# Patient Record
Sex: MALE | Race: WHITE | Employment: FULL TIME | URBAN - METROPOLITAN AREA
[De-identification: names, ages, dates, MRNs, and addresses within clinical notes are randomized per-mention and may not be internally consistent; named-entity substitution may affect disease eponyms.]

---

## 2017-04-21 ENCOUNTER — OFFICE VISIT (OUTPATIENT)
Dept: ORTHOPEDIC SURGERY | Age: 32
End: 2017-04-21

## 2017-04-21 VITALS
HEART RATE: 65 BPM | WEIGHT: 188 LBS | DIASTOLIC BLOOD PRESSURE: 80 MMHG | SYSTOLIC BLOOD PRESSURE: 128 MMHG | BODY MASS INDEX: 27.85 KG/M2 | HEIGHT: 69 IN

## 2017-04-21 DIAGNOSIS — S43.102A AC SEPARATION, TYPE 2, LEFT, INITIAL ENCOUNTER: Primary | ICD-10-CM

## 2017-04-21 PROBLEM — S43.109A AC SEPARATION, TYPE 2: Status: ACTIVE | Noted: 2017-04-21

## 2017-04-21 PROCEDURE — 99243 OFF/OP CNSLTJ NEW/EST LOW 30: CPT | Performed by: ORTHOPAEDIC SURGERY

## 2017-05-19 ENCOUNTER — OFFICE VISIT (OUTPATIENT)
Dept: ORTHOPEDIC SURGERY | Age: 32
End: 2017-05-19

## 2017-05-19 VITALS
HEIGHT: 69 IN | SYSTOLIC BLOOD PRESSURE: 109 MMHG | BODY MASS INDEX: 27.85 KG/M2 | HEART RATE: 79 BPM | DIASTOLIC BLOOD PRESSURE: 70 MMHG | WEIGHT: 188 LBS

## 2017-05-19 DIAGNOSIS — M89.8X1 PAIN OF LEFT CLAVICLE: ICD-10-CM

## 2017-05-19 DIAGNOSIS — S43.102D AC SEPARATION, TYPE 2, LEFT, SUBSEQUENT ENCOUNTER: Primary | ICD-10-CM

## 2017-05-19 PROCEDURE — 99213 OFFICE O/P EST LOW 20 MIN: CPT | Performed by: ORTHOPAEDIC SURGERY

## 2022-08-22 ENCOUNTER — OFFICE VISIT (OUTPATIENT)
Dept: PRIMARY CARE CLINIC | Age: 37
End: 2022-08-22
Payer: COMMERCIAL

## 2022-08-22 VITALS
WEIGHT: 178 LBS | OXYGEN SATURATION: 98 % | SYSTOLIC BLOOD PRESSURE: 110 MMHG | DIASTOLIC BLOOD PRESSURE: 76 MMHG | HEART RATE: 64 BPM | TEMPERATURE: 97.9 F | HEIGHT: 68 IN | BODY MASS INDEX: 26.98 KG/M2

## 2022-08-22 DIAGNOSIS — S43.102S AC SEPARATION, LEFT, SEQUELA: ICD-10-CM

## 2022-08-22 DIAGNOSIS — M77.11 LATERAL EPICONDYLITIS OF BOTH ELBOWS: ICD-10-CM

## 2022-08-22 DIAGNOSIS — I67.1 BRAIN ANEURYSM: Primary | ICD-10-CM

## 2022-08-22 DIAGNOSIS — Z13.220 ENCOUNTER FOR SCREENING FOR LIPOID DISORDERS: ICD-10-CM

## 2022-08-22 DIAGNOSIS — M77.12 LATERAL EPICONDYLITIS OF BOTH ELBOWS: ICD-10-CM

## 2022-08-22 DIAGNOSIS — M25.561 CHRONIC PAIN OF RIGHT KNEE: ICD-10-CM

## 2022-08-22 DIAGNOSIS — G89.29 CHRONIC PAIN OF RIGHT KNEE: ICD-10-CM

## 2022-08-22 DIAGNOSIS — Q27.30 AVM (ARTERIOVENOUS MALFORMATION): ICD-10-CM

## 2022-08-22 DIAGNOSIS — Z13.1 SCREENING FOR DIABETES MELLITUS: ICD-10-CM

## 2022-08-22 DIAGNOSIS — L29.9 PRURITUS: ICD-10-CM

## 2022-08-22 PROCEDURE — 99204 OFFICE O/P NEW MOD 45 MIN: CPT | Performed by: STUDENT IN AN ORGANIZED HEALTH CARE EDUCATION/TRAINING PROGRAM

## 2022-08-22 SDOH — ECONOMIC STABILITY: FOOD INSECURITY: WITHIN THE PAST 12 MONTHS, THE FOOD YOU BOUGHT JUST DIDN'T LAST AND YOU DIDN'T HAVE MONEY TO GET MORE.: NEVER TRUE

## 2022-08-22 SDOH — ECONOMIC STABILITY: FOOD INSECURITY: WITHIN THE PAST 12 MONTHS, YOU WORRIED THAT YOUR FOOD WOULD RUN OUT BEFORE YOU GOT MONEY TO BUY MORE.: NEVER TRUE

## 2022-08-22 ASSESSMENT — PATIENT HEALTH QUESTIONNAIRE - PHQ9
SUM OF ALL RESPONSES TO PHQ QUESTIONS 1-9: 0
1. LITTLE INTEREST OR PLEASURE IN DOING THINGS: 0
SUM OF ALL RESPONSES TO PHQ9 QUESTIONS 1 & 2: 0
SUM OF ALL RESPONSES TO PHQ QUESTIONS 1-9: 0
2. FEELING DOWN, DEPRESSED OR HOPELESS: 0

## 2022-08-22 ASSESSMENT — SOCIAL DETERMINANTS OF HEALTH (SDOH): HOW HARD IS IT FOR YOU TO PAY FOR THE VERY BASICS LIKE FOOD, HOUSING, MEDICAL CARE, AND HEATING?: NOT HARD AT ALL

## 2022-08-22 NOTE — PROGRESS NOTES
[address] [facility] PRIMARY CARE  [place] [address] [address] [facility] [address]  Dept: [phone]  Dept Fax: [phone]  Loc: [phone]      Larry Leon is a 44 y.o. male who presents today for:  Chief Complaint   Patient presents with    Knee Pain     Right        Shoulder Injury     Left         Tendonitis     Bilateral arms      Other     AVM      Skin Problem     Right forearm bumpy      Establish Care         HPI:   Larry Leon is 44 y.o. who presents today for establishing care. Right knee pain - medial, since April 2022. Active. Using ice, stretches, exercise. Feels weak/unstable. No discrete injury. Has popping occasionally. No swelling. Left shoulder -- injury while playing softball. Feeling weak. Unable to do pushup w/o pain. Does not feel like the shoulder dislocated. [facility]). Huyen Mckinney MD     Tendonitis - biceps distally and lateral elbow. Limited strength bilaterally. AVM - in brain, found 8 years ago. Intranidal aneurysm. Transferred to  but he declined treatment at that time. Currently asymptomatic. No family history of aneurysms. FINDINGS-     In the left parietal occipital lobe there is a tangle of abnormal   vessels consistent with an arteriovenous malformation. A round   hyperenhancing focus which corresponds to the hyperdense area on   the CT is 12 x 9 mm and is consistent with an intranidal aneurysm. Corresponding flow voids are present on the T2 sequence. There is   no evidence of surrounding hemorrhage. No abnormal signal on the   gradient echo sequence is present to suggest previous hemorrhage. There is no restricted diffusion or acute infarction. There is no   other mass, mass effect, or midline shift. There is no evidence of   acute intracranial hemorrhage. The ventricles are nondilated. Flow   related signal in the major intracranial vessels is preserved.    Post contrast images show no other pathologic enhancement. left parieto-occipital arteriovenous   malformation and a 12 mm intranidal aneurysm. History of HLD on previous labs on [facility]     Objective:     Vitals:    08/22/22 1613   BP: 110/76   Pulse: 64   Temp: 97.9 °F (36.6 °C)   SpO2: 98%         Wt Readings from Last 3 Encounters:   08/22/22 178 lb (80.7 kg)   05/19/17 188 lb (85.3 kg)   04/21/17 188 lb (85.3 kg)       BP Readings from Last 3 Encounters:   08/22/22 110/76   05/19/17 109/70   04/21/17 128/80     Physical Exam  Vitals and nursing note reviewed. Constitutional:       General: He is not in acute distress. Appearance: He is well-developed. He is not diaphoretic. HENT:      Head: Normocephalic and atraumatic. Right Ear: External ear normal.      Left Ear: External ear normal.      Nose: Nose normal.   Eyes:      General: No scleral icterus. Right eye: No discharge. Left eye: No discharge. Conjunctiva/sclera: Conjunctivae normal.   Cardiovascular:      Rate and Rhythm: Normal rate and regular rhythm. Heart sounds: Normal heart sounds. No murmur heard. Pulmonary:      Effort: Pulmonary effort is normal.      Breath sounds: Normal breath sounds. No wheezing or rales. Musculoskeletal:      Cervical back: Normal range of motion. Comments: UE strength 5/5 in all directions  ROM wnl   Clicking w/ rotation of left shoulder  Obvious separation of left AC joint   Rotator cuff intact, no deficits    Tenderness bilateral lateral epicondyles     Right knee:  Tender to palpation of medial joint line   +Richardson   Pain w/ valgus of MCL, no obvious laxity  No effusion   Skin:     General: Skin is warm and dry. Findings: No erythema or rash. Neurological:      Mental Status: He is alert and oriented to person, place, and time. Psychiatric:         Behavior: Behavior normal.         Thought Content:  Thought content normal.         Judgment: Judgment normal.       Immunization History Administered Date(s) Administered    COVID-19, PFIZER PURPLE top, DILUTE for use, (age 15 y+), 30mcg/0.3mL 04/01/2021, 04/22/2021, 11/30/2021       Health Maintenance Due   Topic Date Due    Depression Screen  Never done    HIV screen  Never done    Hepatitis C screen  Never done    Varicella vaccine (1 of 2 - 2-dose childhood series) Never done    Diabetes screen  Never done       Food Insecurity: No Food Insecurity    Worried About Running Out of Food in the Last Year: Never true    Ran Out of Food in the Last Year: Never true       Assessment / Plan:   1. Brain aneurysm  Chronic, asymptomatic. Referral to Neurosurgery  - KEVIN - Kajal Ferrara MD, Neurosurgery, [facility]    2. AVM (arteriovenous malformation)  Chronic, asymptomatic. Referral to Neurosurgery  - KEVIN Ferrara MD, Neurosurgery, [facility]    3. AC separation, left, sequela  Referral to Orthopedics for second opinion  - Kareem Coyle MD, Orthopedic Surgery, [facility]    4. Lateral epicondylitis of both elbows  Conservative management w/ braces, stretches  Refer to ortho    5. Chronic pain of right knee  Suspect meniscus vs. MCL tendinitis   Check XR knee  Refer to ortho   - XR KNEE RIGHT (3 VIEWS); Future  - Kareem Coyle MD, Orthopedic Surgery, [facility]    6. Pruritus  Check CBC  ? allergic response  Keep diary of symptoms and any exacerbating things   - CBC with Auto Differential; Future    7. Screening for diabetes mellitus  - Comprehensive Metabolic Panel, Fasting; Future    8. Encounter for screening for lipoid disorders  - Lipid, Fasting; Future      Preventative Medicine   UTD         Return in about 1 year (around 8/22/2023) for Physical .      No future appointments. Patient given educational materials - see patient instructions. Discussed use, benefit, and sideeffects of prescribed medications. All patient questions answered. Pt voiced understanding. Reviewed health maintenance. Instructed to continue current medications, diet and exercise. Patient agreed with treatment plan. Follow up as directed.      Electronically signed by Farrah Hickey DO on 8/22/2022 at 5:02 PM

## 2022-08-24 ENCOUNTER — HOSPITAL ENCOUNTER (OUTPATIENT)
Age: 37
Discharge: HOME | End: 2022-08-24
Payer: COMMERCIAL

## 2022-08-24 ENCOUNTER — HOSPITAL ENCOUNTER (OUTPATIENT)
Dept: GENERAL RADIOLOGY | Age: 37
Discharge: HOME | End: 2022-08-24
Payer: COMMERCIAL

## 2022-08-24 DIAGNOSIS — G89.29 CHRONIC PAIN OF RIGHT KNEE: ICD-10-CM

## 2022-08-24 DIAGNOSIS — M25.561 CHRONIC PAIN OF RIGHT KNEE: ICD-10-CM

## 2022-08-24 DIAGNOSIS — Z13.220 ENCOUNTER FOR SCREENING FOR LIPOID DISORDERS: ICD-10-CM

## 2022-08-24 DIAGNOSIS — L29.9 PRURITUS: ICD-10-CM

## 2022-08-24 DIAGNOSIS — Z13.1 SCREENING FOR DIABETES MELLITUS: ICD-10-CM

## 2022-08-24 LAB
A/G RATIO: 2.1 (ref 1.1–2.2)
ALBUMIN SERPL-MCNC: 4.9 G/DL (ref 3.4–5)
ALP BLD-CCNC: 77 U/L (ref 40–129)
ALT SERPL-CCNC: 13 U/L (ref 10–40)
ANION GAP SERPL CALCULATED.3IONS-SCNC: 8 MMOL/L (ref 3–16)
AST SERPL-CCNC: 18 U/L (ref 15–37)
BASOPHILS ABSOLUTE: 0 K/UL (ref 0–0.2)
BASOPHILS RELATIVE PERCENT: 0.3 %
BILIRUB SERPL-MCNC: 0.7 MG/DL (ref 0–1)
BUN BLDV-MCNC: 12 MG/DL (ref 7–20)
CALCIUM SERPL-MCNC: 10 MG/DL (ref 8.3–10.6)
CHLORIDE BLD-SCNC: 102 MMOL/L (ref 99–110)
CHOLESTEROL, FASTING: 249 MG/DL (ref 0–199)
CO2: 30 MMOL/L (ref 21–32)
CREAT SERPL-MCNC: 0.7 MG/DL (ref 0.9–1.3)
EOSINOPHILS ABSOLUTE: 0.1 K/UL (ref 0–0.6)
EOSINOPHILS RELATIVE PERCENT: 2 %
GFR AFRICAN AMERICAN: >60
GFR NON-AFRICAN AMERICAN: >60
GLUCOSE FASTING: 90 MG/DL (ref 70–99)
HCT VFR BLD CALC: 44.3 % (ref 40.5–52.5)
HDLC SERPL-MCNC: 50 MG/DL (ref 40–60)
HEMOGLOBIN: 15.1 G/DL (ref 13.5–17.5)
LDL CHOLESTEROL CALCULATED: 179 MG/DL
LYMPHOCYTES ABSOLUTE: 2.1 K/UL (ref 1–5.1)
LYMPHOCYTES RELATIVE PERCENT: 34.8 %
MCH RBC QN AUTO: 30.4 PG (ref 26–34)
MCHC RBC AUTO-ENTMCNC: 34.2 G/DL (ref 31–36)
MCV RBC AUTO: 89.1 FL (ref 80–100)
MONOCYTES ABSOLUTE: 0.4 K/UL (ref 0–1.3)
MONOCYTES RELATIVE PERCENT: 7.6 %
NEUTROPHILS ABSOLUTE: 3.3 K/UL (ref 1.7–7.7)
NEUTROPHILS RELATIVE PERCENT: 55.3 %
PDW BLD-RTO: 13.3 % (ref 12.4–15.4)
PLATELET # BLD: 231 K/UL (ref 135–450)
PMV BLD AUTO: 8.1 FL (ref 5–10.5)
POTASSIUM SERPL-SCNC: 4.1 MMOL/L (ref 3.5–5.1)
RBC # BLD: 4.97 M/UL (ref 4.2–5.9)
SODIUM BLD-SCNC: 140 MMOL/L (ref 136–145)
TOTAL PROTEIN: 7.2 G/DL (ref 6.4–8.2)
TRIGLYCERIDE, FASTING: 98 MG/DL (ref 0–150)
VLDLC SERPL CALC-MCNC: 20 MG/DL
WBC # BLD: 5.9 K/UL (ref 4–11)

## 2022-08-24 PROCEDURE — 73560 X-RAY EXAM OF KNEE 1 OR 2: CPT

## 2022-08-25 ENCOUNTER — OFFICE VISIT (OUTPATIENT)
Dept: ORTHOPEDIC SURGERY | Age: 37
End: 2022-08-25
Payer: COMMERCIAL

## 2022-08-25 VITALS — HEIGHT: 69 IN | WEIGHT: 178 LBS | BODY MASS INDEX: 26.36 KG/M2

## 2022-08-25 DIAGNOSIS — S83.241A TRAUMATIC TEAR OF MEDIAL MENISCUS OF RIGHT KNEE, INITIAL ENCOUNTER: Primary | ICD-10-CM

## 2022-08-25 DIAGNOSIS — M25.561 ACUTE PAIN OF RIGHT KNEE: ICD-10-CM

## 2022-08-25 PROCEDURE — 99203 OFFICE O/P NEW LOW 30 MIN: CPT | Performed by: ORTHOPAEDIC SURGERY

## 2022-08-25 PROCEDURE — G8427 DOCREV CUR MEDS BY ELIG CLIN: HCPCS | Performed by: ORTHOPAEDIC SURGERY

## 2022-08-25 PROCEDURE — G8419 CALC BMI OUT NRM PARAM NOF/U: HCPCS | Performed by: ORTHOPAEDIC SURGERY

## 2022-08-25 PROCEDURE — 1036F TOBACCO NON-USER: CPT | Performed by: ORTHOPAEDIC SURGERY

## 2022-08-25 NOTE — PROGRESS NOTES
Fly Denisse in today for new injury involving his right knee. In April he was building a shed and began having significant medial knee pain associated with kneeling squatting and twisting. His pain has persisted despite ice, stretching, exercises, and ibuprofen. He has significant pain when he shifts laterally and he will feel a pop in the medial knee. Pain at best is 3 out of 10 but can be made worse. He works as a . He is otherwise healthy. History: Patient's relevant past family, medical, and social history are reviewed as part of today's visit. ROS of pertinent positives and negatives as above; otherwise negative. General Exam:    Vitals: Height 5' 9\" (1.753 m), weight 178 lb (80.7 kg). Constitutional: Patient is adequately groomed with no evidence of malnutrition  Mental Status: The patient is oriented to time, place and person. The patient's mood and affect are appropriate. Gait:  Patient walks with normal gait and station. Lymphatic: The lymphatic examination bilaterally reveals all areas to be without enlargement or induration. Vascular: Examination reveals no swelling or calf tenderness. Peripheral pulses are palpable and 2+. Neurological: The patient has good coordination. There is no weakness or sensory deficit. Skin:    Head/Neck: inspection reveals no rashes, ulcerations or lesions. Trunk:  inspection reveals no rashes, ulcerations or lesions. Right Lower Extremity: inspection reveals no rashes, ulcerations or lesions. Left Lower Extremity: inspection reveals no rashes, ulcerations or lesions. Examination of the bilateral hips reveals normal flexion and extension. There is no restriction in rotation. There is no tenderness to palpation anteriorly posteriorly or laterally. Left knee examination demonstrates no effusion. There is no tenderness to palpation over the medial or lateral joint line. There is no discomfort over the patellar tendon.   There is no palpable popliteal cyst.  Sensation is intact. Range of motion is normal.  There is no patellofemoral crepitation. There is no instability to varus or valgus stress applied at 0, 30, 60, or 90Â° of flexion. There is no anterior or posterior drawer. Lachman examination is normal.    Right knee has mild crepitation. He has significant pain over the medial joint line. He has pain with Richardson's maneuver. He is ligamentously stable. He has no large effusion. There is a palpable click medially. Right knee x-rays from the hospital 2 views are reviewed and show no acute bony abnormalities. Assessment: Traumatic medial meniscus tear right knee. Plan: MRI right knee.   Follow-up with me after the scan

## 2022-09-01 ENCOUNTER — OFFICE VISIT (OUTPATIENT)
Dept: ORTHOPEDIC SURGERY | Age: 37
End: 2022-09-01
Payer: COMMERCIAL

## 2022-09-01 VITALS — HEIGHT: 69 IN | BODY MASS INDEX: 26.36 KG/M2 | WEIGHT: 178 LBS

## 2022-09-01 DIAGNOSIS — M25.561 ACUTE PAIN OF RIGHT KNEE: ICD-10-CM

## 2022-09-01 DIAGNOSIS — S83.241A TRAUMATIC TEAR OF MEDIAL MENISCUS OF RIGHT KNEE, INITIAL ENCOUNTER: Primary | ICD-10-CM

## 2022-09-01 PROCEDURE — G8427 DOCREV CUR MEDS BY ELIG CLIN: HCPCS | Performed by: ORTHOPAEDIC SURGERY

## 2022-09-01 PROCEDURE — 1036F TOBACCO NON-USER: CPT | Performed by: ORTHOPAEDIC SURGERY

## 2022-09-01 PROCEDURE — 99214 OFFICE O/P EST MOD 30 MIN: CPT | Performed by: ORTHOPAEDIC SURGERY

## 2022-09-01 PROCEDURE — G8419 CALC BMI OUT NRM PARAM NOF/U: HCPCS | Performed by: ORTHOPAEDIC SURGERY

## 2022-09-01 NOTE — PROGRESS NOTES
Natty Son returns today to follow-up his right knee MRI. At rest pain is only 2 out of 10. He has not been very active. General Exam:    Vitals: Height 5' 9\" (1.753 m), weight 178 lb (80.7 kg). Constitutional: Patient is adequately groomed with no evidence of malnutrition  Mental Status: The patient is oriented to time, place and person. The patient's mood and affect are appropriate. Right knee has mild crepitation. He has significant pain over the medial joint line. He has pain with Richardson's maneuver. He is ligamentously stable. He has no large effusion. There is a palpable click medially      MRI right knee is reviewed and demonstrates:  Exam Date: 08/26/2022   Exam Description: MR Right Knee w/o Contrast            HISTORY:  Meniscal tear. Knee pain. Tenderness and popping. TECHNICAL FACTORS:  Long- and short-axis fat- and water-weighted images were performed. COMPARISON:  None. FINDINGS:  Swelling in the knee notch. Intact ACL and PCL without tear. Intact thickened MCL without tear, medial joint line swelling. Intact ITB without tear. Normal FCL. Flap tear of the medial meniscus midbody into posterior horn 3 cm. No lateral meniscus tear. Mild medial femorotibial cartilage thinning. Mild lateral femorotibial chondromalacia. Intact quadriceps and patellar tendons. Lateral patellar subluxation and tilt. Mild insertional quadriceps enthesopathy. No Anna or    [place]. Patellofemoral chondromalacia. No plical thickening. Effusion. No loose body. Slightly leaking Baker's cyst.       No stress fracture or macro fracture. Normal tibiofibular joint. CONCLUSION:   1. Flap tear of the medial meniscus midbody into posterior horn 3 cm. 2. Mild tricompartment chondromalacia. 3. Effusion and leaking Baker's cyst.       I reviewed these findings on the report and the images with the patient.     Assessment: Flap tear medial meniscus right knee. Plan: We reviewed the risks, benefits, and alternatives to surgery. The alternatives include conservative management including medications, injections, and physical therapy as well as observation. Risks of surgery include but are not limited to persistent pain, instability, and reinjury. Risks also include risk of infection which could result in the need for further surgery and long-term use of antibiotics. Risks also include deep venous thromboses and pulmonary emboli. Risks also include problems with anesthesia including but not limited to cardiovascular compromise , stroke,  and death. The patient understands that the goal of surgery is to improve pain and function but that can never be guaranteed. We discussed at length the option of right knee arthroscopy with partial medial meniscectomy. He would likely proceed with that the knee is calm and talk about things with his family and work. He will let us know how he would like to proceed. All questions have been answered at length.

## 2022-09-08 ENCOUNTER — TELEPHONE (OUTPATIENT)
Dept: ORTHOPEDIC SURGERY | Age: 37
End: 2022-09-08

## 2022-09-08 NOTE — TELEPHONE ENCOUNTER
Called and scheduled patient for surgery 10-12-22. Patient is currently recovering from Covid and was symptomatic. Patient will stop in office to sign the consent.

## 2022-09-08 NOTE — TELEPHONE ENCOUNTER
Surgery and/or Procedure Scheduling     Contact Name: Terry Johnson"  Surgical/Procedure Request: SX ON RT KNEE  Patient Contact Number: [phone]    Patient call and would like to get schedule for Sx. Please Advise.

## 2022-09-21 ENCOUNTER — TELEPHONE (OUTPATIENT)
Dept: ORTHOPEDIC SURGERY | Age: 37
End: 2022-09-21

## 2022-10-05 ENCOUNTER — TELEPHONE (OUTPATIENT)
Dept: ORTHOPEDIC SURGERY | Age: 37
End: 2022-10-05

## 2022-10-05 RX ORDER — MINOXIDIL 50 MG/G
AEROSOL, FOAM TOPICAL 2 TIMES DAILY
COMMUNITY

## 2022-10-05 RX ORDER — FINASTERIDE 1 MG/1
1 TABLET, FILM COATED ORAL NIGHTLY
COMMUNITY

## 2022-10-05 NOTE — PROGRESS NOTES
DOS 10/12/22   83    PATIENT SEEING MD COVERING FOR DR. BALES (WHO IS OUT ON LEAVE) ON 10/10/22- [facility] PRE-OP H&P [facility] PROVIDER-PLEASE MAKE SURE IN Epic      See 10/10/22 encounters office visit for H&P

## 2022-10-05 NOTE — TELEPHONE ENCOUNTER
CPT: 49672  BODY PART: right knee  STATUS: outpatient  LOCATION: [facility]  AUTHORIZATION: approved    Submitted online with [facility], [id number]    Surgery approved # [id number]  10/12/22-1/10/23

## 2022-10-05 NOTE — PROGRESS NOTES
[facility] [facility] time__0950__________        Surgery time__1150__________    Take the following medications with a sip of water: Follow your MD/Surgeons pre-procedure instructions regarding your medications     Do not eat or drink anything after 12:00 midnight prior to your surgery. This includes water chewing gum, mints and ice chips. You may brush your teeth and gargle the morning of your surgery, but do not swallow the water     Please see your family doctor/pediatrician for a history and physical and/or concerning medications. Bring any test results/reports from your physicians office. If you are under the care of a heart doctor or specialist doctor, please be aware that you may be asked to them for clearance    You may be asked to stop blood thinners such as Coumadin, Plavix, Fragmin, Lovenox, etc., or any anti-inflammatories such as:  Aspirin, Ibuprofen, Advil, Naproxen prior to your surgery. MAY TAKE TYLENOL   We also ask that you stop any OTC medications such as fish oil, vitamin E, glucosamine, garlic, Multivitamins, COQ 10, etc.    We ask that you do not smoke 24 hours prior to surgery  We ask that you do not  drink any alcoholic beverages 24 hours prior to surgery     You must make arrangements for a responsible adult to take you home after your surgery. For your safety you will not be allowed to leave alone or drive yourself home. Your surgery will be cancelled if you do not have a ride home. Also for your safety, it is strongly suggested that someone stay with you the first 24 hours after your surgery. A parent or legal guardian must accompany a child scheduled for surgery and plan to stay at the hospital until the child is discharged. Please do not bring other children with you. For your comfort, please wear simple loose fitting clothing to the hospital.  Please do not bring valuables.     Do not wear any make-up or nail polish on your fingers or toes      For your safety, please do not wear any jewelry or body piercing's on the day of surgery. All jewelry must be removed. If you have dentures, they will be removed before going to operating room. For your convenience, we will provide you with a container. If you wear contact lenses or glasses, they will be removed, please bring a case for them. If you have a living will and a durable power of  for healthcare, please bring in a copy. As part of our patient safety program to minimize surgical site infections, we ask you to do the following:    Please notify your surgeon if you develop any illness between         now and the  day of your surgery. This includes a cough, cold, fever, sore throat, nausea,         or vomiting, and diarrhea, etc.   Please notify your surgeon if you experience dizziness, shortness         of breath or blurred vision between now and the time of your surgery. Do not shave your operative site 96 hours prior to surgery. For face and neck surgery, men may use an electric razor 48 hours   prior to surgery. You may shower the night before surgery or the morning of   your surgery with an antibacterial soap. You will need to bring a photo ID and insurance card    [facility] has an onsite pharmacy, would you like to utilize our pharmacy     If you will be staying overnight and use a C-pap machine, please bring   your C-pap to hospital     Our goal is to provide you with excellent care, therefore, visitors will be limited to two(2) in the room at a time so that we may focus on providing this care for you. Please contact pre-admission testing if you have any further questions.                  [facility] phone number:  [phone] [facility] fax number:  [phone]  Please note these are generalized instructions for all surgical cases, you may be provided with more specific instructions according to your surgery. C-Difficile admission screening and protocol:       * Admitted with diarrhea? [] YES    [x]  NO     *Prior history of C-Diff. In last 3 months? [] YES    [x]  NO     *Antibiotic use in the past 6-8 weeks? [x]  NO    []  YES                 If yes, which ANTIBIOTIC AND REASON______     *Prior hospitalization or nursing home in the last month? []  YES    [x]  NO        SAFETY FIRST. .call before you fall

## 2022-10-10 ENCOUNTER — OFFICE VISIT (OUTPATIENT)
Dept: PRIMARY CARE CLINIC | Age: 37
End: 2022-10-10
Payer: COMMERCIAL

## 2022-10-10 VITALS
DIASTOLIC BLOOD PRESSURE: 72 MMHG | HEART RATE: 82 BPM | WEIGHT: 180.2 LBS | BODY MASS INDEX: 26.69 KG/M2 | TEMPERATURE: 97.3 F | OXYGEN SATURATION: 96 % | RESPIRATION RATE: 18 BRPM | SYSTOLIC BLOOD PRESSURE: 110 MMHG | HEIGHT: 69 IN

## 2022-10-10 DIAGNOSIS — G89.29 CHRONIC PAIN OF RIGHT KNEE: ICD-10-CM

## 2022-10-10 DIAGNOSIS — Z01.818 PREOP EXAMINATION: Primary | ICD-10-CM

## 2022-10-10 DIAGNOSIS — M25.561 CHRONIC PAIN OF RIGHT KNEE: ICD-10-CM

## 2022-10-10 PROCEDURE — G8419 CALC BMI OUT NRM PARAM NOF/U: HCPCS | Performed by: STUDENT IN AN ORGANIZED HEALTH CARE EDUCATION/TRAINING PROGRAM

## 2022-10-10 PROCEDURE — G8484 FLU IMMUNIZE NO ADMIN: HCPCS | Performed by: STUDENT IN AN ORGANIZED HEALTH CARE EDUCATION/TRAINING PROGRAM

## 2022-10-10 PROCEDURE — 1036F TOBACCO NON-USER: CPT | Performed by: STUDENT IN AN ORGANIZED HEALTH CARE EDUCATION/TRAINING PROGRAM

## 2022-10-10 PROCEDURE — 99213 OFFICE O/P EST LOW 20 MIN: CPT | Performed by: STUDENT IN AN ORGANIZED HEALTH CARE EDUCATION/TRAINING PROGRAM

## 2022-10-10 PROCEDURE — G8427 DOCREV CUR MEDS BY ELIG CLIN: HCPCS | Performed by: STUDENT IN AN ORGANIZED HEALTH CARE EDUCATION/TRAINING PROGRAM

## 2022-10-10 ASSESSMENT — PATIENT HEALTH QUESTIONNAIRE - PHQ9
SUM OF ALL RESPONSES TO PHQ QUESTIONS 1-9: 0
2. FEELING DOWN, DEPRESSED OR HOPELESS: 0
SUM OF ALL RESPONSES TO PHQ QUESTIONS 1-9: 0

## 2022-10-10 NOTE — PROGRESS NOTES
Preoperative Consultation      Tova Sanchez  YOB: 1985    Date of Service:  10/10/2022    Vitals:    10/10/22 0838   BP: 110/72   Site: Right Upper Arm   Position: Sitting   Cuff Size: Large Adult   Pulse: 82   Resp: 18   Temp: 97.3 °F (36.3 °C)   TempSrc: Infrared   SpO2: 96%   Weight: 180 lb 3.2 oz (81.7 kg)   Height: 5' 9\" (1.753 m)      Wt Readings from Last 2 Encounters:   10/13/22 175 lb (79.4 kg)   10/12/22 175 lb 4.3 oz (79.5 kg)     BP Readings from Last 3 Encounters:   10/12/22 110/65   10/10/22 110/72   08/22/22 110/76        Chief Complaint   Patient presents with    Pre-op Exam     Randi Garza On RT Knee   Dr. Nils BOBO 10/12/22     No Known Allergies  Outpatient Medications Marked as Taking for the 10/10/22 encounter (Office Visit) with Jasvir Mendez MD   Medication Sig Dispense Refill    finasteride (PROPECIA) 1 MG tablet Take 1 mg by mouth at bedtime      Minoxidil (MINOXIDIL FOR MEN) 5 % FOAM Apply topically 2 times daily APPLY TO SCALP         This patient presents to the office today for a preoperative consultation at the request of surgeon, See above. History of AVM, 8 yrs ago found it incidentally doesn't cause him any issues. Right knee pain since March this year progressively getting worse, feels unstable, was doing stretches and exercise at home but no physical therapy. Had MRI which showed tear.       Planned anesthesia: General   Known anesthesia problems: None   Bleeding risk: No recent or remote history of abnormal bleeding  Personal or FH of DVT/PE: No    Patient objection to receiving blood products: No          Patient Active Problem List   Diagnosis    AC separation, left, sequela    Lateral epicondylitis of both elbows    Arteriovenous malformation    Brain aneurysm    Chronic pain of right knee    Acute medial meniscus tear of right knee       Past Medical History:   Diagnosis Date    AVM (arteriovenous malformation) brain Hyperlipidemia     NO MEDS    Wears contact lenses     Wears glasses      Past Surgical History:   Procedure Laterality Date    CYST REMOVAL Left 2003    left wrist    KNEE ARTHROSCOPY Right 10/12/2022    RIGHT KNEE ARTHROSCOPY MEDIAL MENISCECTOMY performed by Eric Mccray MD at [address] [place] [address]       Family History   Problem Relation Age of Onset    No Known Problems Mother     High Cholesterol Father     No Known Problems Sister      Social History     Socioeconomic History    Marital status: Single     Spouse name: Not on file    Number of children: Not on file    Years of education: Not on file    Highest education level: Not on file   Occupational History    Not on file   Tobacco Use    Smoking status: Never    Smokeless tobacco: Never   Vaping Use    Vaping Use: Never used   Substance and Sexual Activity    Alcohol use: Yes     Alcohol/week: 5.0 standard drinks     Types: 5 Cans of beer per week     Comment: SOCIALLY    Drug use: Never    Sexual activity: Not on file   Other Topics Concern    Not on file   Social History Narrative    Not on file     Social Determinants of Health     Financial Resource Strain: Low Risk     Difficulty of Paying Living Expenses: Not hard at all   Food Insecurity: No Food Insecurity    Worried About Running Out of Food in the Last Year: Never true    Ran Out of Food in the Last Year: Never true   Transportation Needs: Not on file   Physical Activity: Not on file   Stress: Not on file   Social Connections: Not on file   Intimate Partner Violence: Not on file   Housing Stability: Not on file       Review of Systems  A comprehensive review of systems was negative except for what was noted in the HPI. Physical Exam   Constitutional: He is oriented to person, place, and time. He appears well-developed and well-nourished. No distress. HENT:   Head: Normocephalic and atraumatic.    Mouth/Throat: Uvula is midline, oropharynx is clear and moist and mucous membranes are normal.   Eyes: Conjunctivae and EOM are normal. Pupils are equal, round, and reactive to light. Neck: Trachea normal and normal range of motion. Neck supple. No JVD present. Carotid bruit is not present. No mass and no thyromegaly present. Cardiovascular: Normal rate, regular rhythm, normal heart sounds and intact distal pulses. Exam reveals no gallop and no friction rub. No murmur heard. Pulmonary/Chest: Effort normal and breath sounds normal. No respiratory distress. He has no wheezes. He has no rales. Abdominal: Soft. Normal aorta and bowel sounds are normal. He exhibits no distension and no mass. There is no hepatosplenomegaly. No tenderness. Musculoskeletal: He exhibits no edema and no tenderness. Neurological: He is alert and oriented to person, place, and time. He has normal strength. No cranial nerve deficit or sensory deficit. Coordination and gait normal.   Skin: Skin is warm and dry. No rash noted. No erythema. Psychiatric: He has a normal mood and affect.  His behavior is normal.     EKG Interpretation:    N/A    Lab Review   Orders Only on 08/24/2022   Component Date Value    Cholesterol, Fasting 08/24/2022 249 (A)     Triglyceride, Fasting 08/24/2022 98     HDL 08/24/2022 50     LDL Calculated 08/24/2022 179 (A)     VLDL Cholesterol Calcula* 08/24/2022 20     WBC 08/24/2022 5.9     RBC 08/24/2022 4.97     Hemoglobin 08/24/2022 15.1     Hematocrit 08/24/2022 44.3     MCV 08/24/2022 89.1     MCH 08/24/2022 30.4     MCHC 08/24/2022 34.2     RDW 08/24/2022 13.3     Platelets 74/71/6544 231     MPV 08/24/2022 8.1     Neutrophils % 08/24/2022 55.3     Lymphocytes % 08/24/2022 34.8     Monocytes % 08/24/2022 7.6     Eosinophils % 08/24/2022 2.0     Basophils % 08/24/2022 0.3     Neutrophils Absolute 08/24/2022 3.3     Lymphocytes Absolute 08/24/2022 2.1     Monocytes Absolute 08/24/2022 0.4     Eosinophils Absolute 08/24/2022 0.1     Basophils Absolute 08/24/2022 0.0     Sodium 08/24/2022 140 Potassium 08/24/2022 4.1     Chloride 08/24/2022 102     CO2 08/24/2022 30     Anion Gap 08/24/2022 8     Glucose, Fasting 08/24/2022 90     BUN 08/24/2022 12     Creatinine 08/24/2022 0.7 (A)     GFR Non- 08/24/2022 >60     GFR  08/24/2022 >60     Calcium 08/24/2022 10.0     Total Protein 08/24/2022 7.2     Albumin 08/24/2022 4.9     Albumin/Globulin Ratio 08/24/2022 2.1     Total Bilirubin 08/24/2022 0.7     Alkaline Phosphatase 08/24/2022 77     ALT 08/24/2022 13     AST 08/24/2022 18            Assessment:       44 y.o. patient with planned surgery as above. Known risk factors for perioperative complications: None  Current medications which may produce withdrawal symptoms if withheld perioperatively: none      Plan:     1. Preoperative workup as follows: none  2. Change in medication regimen before surgery: Hold all medications on morning of surgery  3. Prophylaxis for cardiac events with perioperative beta-blockers: Not indicated  ACC/AHA indications for pre-operative beta-blocker use:    Vascular surgery with history of postitive stress test  Intermediate or high risk surgery with history of CAD   Intermediate or high risk surgery with multiple clinical predictors of CAD- 2 of the following: history of compensated or prior heart failure, history of cerebrovascular disease, DM, or renal insufficiency    Routine administration of higher-dose, long-acting metoprolol in beta-blocker-naïve patients on the day of surgery, and in the absence of dose titration is associated with an overall increase in mortality. Beta-blockers should be started days to weeks prior to surgery and titrated to pulse < 70.  4. Deep vein thrombosis prophylaxis: regimen to be chosen by surgical team  5.  No contraindications to planned surgery

## 2022-10-11 ENCOUNTER — ANESTHESIA EVENT (OUTPATIENT)
Dept: OPERATING ROOM | Age: 37
End: 2022-10-11
Payer: COMMERCIAL

## 2022-10-11 DIAGNOSIS — M25.561 ACUTE PAIN OF RIGHT KNEE: ICD-10-CM

## 2022-10-11 DIAGNOSIS — S83.241A TRAUMATIC TEAR OF MEDIAL MENISCUS OF RIGHT KNEE, INITIAL ENCOUNTER: Primary | ICD-10-CM

## 2022-10-11 RX ORDER — HYDROCODONE BITARTRATE AND ACETAMINOPHEN 5; 325 MG/1; MG/1
1 TABLET ORAL EVERY 4 HOURS PRN
Qty: 20 TABLET | Refills: 0 | Status: SHIPPED | OUTPATIENT
Start: 2022-10-12 | End: 2022-10-17

## 2022-10-11 RX ORDER — DOCUSATE SODIUM 100 MG/1
100 CAPSULE, LIQUID FILLED ORAL 2 TIMES DAILY
Qty: 60 CAPSULE | Refills: 0 | Status: SHIPPED | OUTPATIENT
Start: 2022-10-12

## 2022-10-11 RX ORDER — PROMETHAZINE HYDROCHLORIDE 25 MG/1
25 TABLET ORAL EVERY 6 HOURS PRN
Qty: 30 TABLET | Refills: 0 | Status: SHIPPED | OUTPATIENT
Start: 2022-10-12 | End: 2022-10-18

## 2022-10-12 ENCOUNTER — HOSPITAL ENCOUNTER (OUTPATIENT)
Age: 37
Setting detail: OUTPATIENT SURGERY
Discharge: HOME | End: 2022-10-12
Attending: ORTHOPAEDIC SURGERY | Admitting: ORTHOPAEDIC SURGERY
Payer: COMMERCIAL

## 2022-10-12 ENCOUNTER — ANESTHESIA (OUTPATIENT)
Dept: OPERATING ROOM | Age: 37
End: 2022-10-12
Payer: COMMERCIAL

## 2022-10-12 VITALS
DIASTOLIC BLOOD PRESSURE: 65 MMHG | HEIGHT: 69 IN | OXYGEN SATURATION: 98 % | RESPIRATION RATE: 16 BRPM | SYSTOLIC BLOOD PRESSURE: 110 MMHG | TEMPERATURE: 96.8 F | HEART RATE: 60 BPM | WEIGHT: 175.27 LBS | BODY MASS INDEX: 25.96 KG/M2

## 2022-10-12 PROBLEM — S83.241A ACUTE MEDIAL MENISCUS TEAR OF RIGHT KNEE: Status: ACTIVE | Noted: 2022-10-12

## 2022-10-12 PROCEDURE — 7100000010 HC PHASE II RECOVERY - FIRST 15 MIN: Performed by: ORTHOPAEDIC SURGERY

## 2022-10-12 PROCEDURE — 3600000004 HC SURGERY LEVEL 4 BASE: Performed by: ORTHOPAEDIC SURGERY

## 2022-10-12 PROCEDURE — 3700000001 HC ADD 15 MINUTES (ANESTHESIA): Performed by: ORTHOPAEDIC SURGERY

## 2022-10-12 PROCEDURE — 2580000003 HC RX 258: Performed by: ORTHOPAEDIC SURGERY

## 2022-10-12 PROCEDURE — 2500000003 HC RX 250 WO HCPCS

## 2022-10-12 PROCEDURE — 7100000001 HC PACU RECOVERY - ADDTL 15 MIN: Performed by: ORTHOPAEDIC SURGERY

## 2022-10-12 PROCEDURE — 3700000000 HC ANESTHESIA ATTENDED CARE: Performed by: ORTHOPAEDIC SURGERY

## 2022-10-12 PROCEDURE — 7100000000 HC PACU RECOVERY - FIRST 15 MIN: Performed by: ORTHOPAEDIC SURGERY

## 2022-10-12 PROCEDURE — 3600000014 HC SURGERY LEVEL 4 ADDTL 15MIN: Performed by: ORTHOPAEDIC SURGERY

## 2022-10-12 PROCEDURE — 6360000002 HC RX W HCPCS

## 2022-10-12 PROCEDURE — 2720000010 HC SURG SUPPLY STERILE: Performed by: ORTHOPAEDIC SURGERY

## 2022-10-12 PROCEDURE — 2500000003 HC RX 250 WO HCPCS: Performed by: ORTHOPAEDIC SURGERY

## 2022-10-12 PROCEDURE — 6360000002 HC RX W HCPCS: Performed by: ORTHOPAEDIC SURGERY

## 2022-10-12 PROCEDURE — 6360000002 HC RX W HCPCS: Performed by: ANESTHESIOLOGY

## 2022-10-12 PROCEDURE — 7100000011 HC PHASE II RECOVERY - ADDTL 15 MIN: Performed by: ORTHOPAEDIC SURGERY

## 2022-10-12 PROCEDURE — 2709999900 HC NON-CHARGEABLE SUPPLY: Performed by: ORTHOPAEDIC SURGERY

## 2022-10-12 RX ORDER — HYDROCODONE BITARTRATE AND ACETAMINOPHEN 5; 325 MG/1; MG/1
1 TABLET ORAL
Status: DISCONTINUED | OUTPATIENT
Start: 2022-10-12 | End: 2022-10-12 | Stop reason: HOSPADM

## 2022-10-12 RX ORDER — PROPOFOL 10 MG/ML
INJECTION, EMULSION INTRAVENOUS PRN
Status: DISCONTINUED | OUTPATIENT
Start: 2022-10-12 | End: 2022-10-12 | Stop reason: SDUPTHER

## 2022-10-12 RX ORDER — SODIUM CHLORIDE 9 MG/ML
INJECTION, SOLUTION INTRAVENOUS PRN
Status: DISCONTINUED | OUTPATIENT
Start: 2022-10-12 | End: 2022-10-12 | Stop reason: HOSPADM

## 2022-10-12 RX ORDER — MIDAZOLAM HYDROCHLORIDE 1 MG/ML
INJECTION INTRAMUSCULAR; INTRAVENOUS PRN
Status: DISCONTINUED | OUTPATIENT
Start: 2022-10-12 | End: 2022-10-12 | Stop reason: SDUPTHER

## 2022-10-12 RX ORDER — DEXAMETHASONE SODIUM PHOSPHATE 4 MG/ML
INJECTION, SOLUTION INTRA-ARTICULAR; INTRALESIONAL; INTRAMUSCULAR; INTRAVENOUS; SOFT TISSUE PRN
Status: DISCONTINUED | OUTPATIENT
Start: 2022-10-12 | End: 2022-10-12 | Stop reason: SDUPTHER

## 2022-10-12 RX ORDER — OXYCODONE HYDROCHLORIDE 5 MG/1
5 TABLET ORAL
Status: DISCONTINUED | OUTPATIENT
Start: 2022-10-12 | End: 2022-10-12 | Stop reason: HOSPADM

## 2022-10-12 RX ORDER — ONDANSETRON 2 MG/ML
INJECTION INTRAMUSCULAR; INTRAVENOUS PRN
Status: DISCONTINUED | OUTPATIENT
Start: 2022-10-12 | End: 2022-10-12 | Stop reason: SDUPTHER

## 2022-10-12 RX ORDER — FENTANYL CITRATE 50 UG/ML
INJECTION, SOLUTION INTRAMUSCULAR; INTRAVENOUS PRN
Status: DISCONTINUED | OUTPATIENT
Start: 2022-10-12 | End: 2022-10-12 | Stop reason: SDUPTHER

## 2022-10-12 RX ORDER — ONDANSETRON 2 MG/ML
4 INJECTION INTRAMUSCULAR; INTRAVENOUS
Status: DISCONTINUED | OUTPATIENT
Start: 2022-10-12 | End: 2022-10-12 | Stop reason: HOSPADM

## 2022-10-12 RX ORDER — SODIUM CHLORIDE 0.9 % (FLUSH) 0.9 %
5-40 SYRINGE (ML) INJECTION EVERY 12 HOURS SCHEDULED
Status: DISCONTINUED | OUTPATIENT
Start: 2022-10-12 | End: 2022-10-12 | Stop reason: HOSPADM

## 2022-10-12 RX ORDER — LIDOCAINE HYDROCHLORIDE 20 MG/ML
INJECTION, SOLUTION EPIDURAL; INFILTRATION; INTRACAUDAL; PERINEURAL PRN
Status: DISCONTINUED | OUTPATIENT
Start: 2022-10-12 | End: 2022-10-12 | Stop reason: SDUPTHER

## 2022-10-12 RX ORDER — SODIUM CHLORIDE, SODIUM LACTATE, POTASSIUM CHLORIDE, CALCIUM CHLORIDE 600; 310; 30; 20 MG/100ML; MG/100ML; MG/100ML; MG/100ML
INJECTION, SOLUTION INTRAVENOUS CONTINUOUS PRN
Status: COMPLETED | OUTPATIENT
Start: 2022-10-12 | End: 2022-10-12

## 2022-10-12 RX ORDER — BUPIVACAINE HYDROCHLORIDE AND EPINEPHRINE 5; 5 MG/ML; UG/ML
INJECTION, SOLUTION EPIDURAL; INTRACAUDAL; PERINEURAL
Status: COMPLETED | OUTPATIENT
Start: 2022-10-12 | End: 2022-10-12

## 2022-10-12 RX ORDER — SODIUM CHLORIDE 0.9 % (FLUSH) 0.9 %
5-40 SYRINGE (ML) INJECTION PRN
Status: DISCONTINUED | OUTPATIENT
Start: 2022-10-12 | End: 2022-10-12 | Stop reason: HOSPADM

## 2022-10-12 RX ORDER — FENTANYL CITRATE 50 UG/ML
25 INJECTION, SOLUTION INTRAMUSCULAR; INTRAVENOUS EVERY 5 MIN PRN
Status: DISCONTINUED | OUTPATIENT
Start: 2022-10-12 | End: 2022-10-12 | Stop reason: HOSPADM

## 2022-10-12 RX ADMIN — ONDANSETRON 4 MG: 2 INJECTION INTRAMUSCULAR; INTRAVENOUS at 12:35

## 2022-10-12 RX ADMIN — PROPOFOL 200 MG: 10 INJECTION, EMULSION INTRAVENOUS at 12:05

## 2022-10-12 RX ADMIN — HYDROMORPHONE HYDROCHLORIDE 0.5 MG: 1 INJECTION, SOLUTION INTRAMUSCULAR; INTRAVENOUS; SUBCUTANEOUS at 13:31

## 2022-10-12 RX ADMIN — HYDROMORPHONE HYDROCHLORIDE 0.5 MG: 1 INJECTION, SOLUTION INTRAMUSCULAR; INTRAVENOUS; SUBCUTANEOUS at 13:15

## 2022-10-12 RX ADMIN — MIDAZOLAM 2 MG: 1 INJECTION INTRAMUSCULAR; INTRAVENOUS at 12:05

## 2022-10-12 RX ADMIN — FENTANYL CITRATE 50 MCG: 50 INJECTION INTRAMUSCULAR; INTRAVENOUS at 12:16

## 2022-10-12 RX ADMIN — CEFAZOLIN 2000 MG: 2 INJECTION, POWDER, FOR SOLUTION INTRAMUSCULAR; INTRAVENOUS at 12:03

## 2022-10-12 RX ADMIN — DEXAMETHASONE SODIUM PHOSPHATE 10 MG: 4 INJECTION, SOLUTION INTRAMUSCULAR; INTRAVENOUS at 12:07

## 2022-10-12 RX ADMIN — FENTANYL CITRATE 50 MCG: 50 INJECTION INTRAMUSCULAR; INTRAVENOUS at 12:09

## 2022-10-12 RX ADMIN — LIDOCAINE HYDROCHLORIDE 100 MG: 20 INJECTION, SOLUTION EPIDURAL; INFILTRATION; INTRACAUDAL; PERINEURAL at 12:05

## 2022-10-12 ASSESSMENT — PAIN DESCRIPTION - FREQUENCY
FREQUENCY: CONTINUOUS

## 2022-10-12 ASSESSMENT — PAIN DESCRIPTION - DESCRIPTORS
DESCRIPTORS: ACHING

## 2022-10-12 ASSESSMENT — PAIN DESCRIPTION - LOCATION
LOCATION: KNEE

## 2022-10-12 ASSESSMENT — PAIN DESCRIPTION - ONSET
ONSET: ON-GOING
ONSET: ON-GOING

## 2022-10-12 ASSESSMENT — PAIN SCALES - GENERAL
PAINLEVEL_OUTOF10: 4
PAINLEVEL_OUTOF10: 4
PAINLEVEL_OUTOF10: 7
PAINLEVEL_OUTOF10: 7
PAINLEVEL_OUTOF10: 4

## 2022-10-12 ASSESSMENT — PAIN - FUNCTIONAL ASSESSMENT: PAIN_FUNCTIONAL_ASSESSMENT: 0-10

## 2022-10-12 ASSESSMENT — PAIN DESCRIPTION - PAIN TYPE
TYPE: SURGICAL PAIN

## 2022-10-12 ASSESSMENT — PAIN DESCRIPTION - ORIENTATION
ORIENTATION: RIGHT

## 2022-10-12 NOTE — ANESTHESIA POSTPROCEDURE EVALUATION
[facility] Department of Anesthesiology  Post-Anesthesia Note       Name:  Zhang Flannery                                  Age:  44 y.o. MRN:  [id number]     Last Vitals & Oxygen Saturation: /68   Pulse 66   Temp 96.8 °F (36 °C) (Temporal)   Resp 12   Ht 5' 9\" (1.753 m)   Wt 175 lb 4.3 oz (79.5 kg)   SpO2 94%   BMI 25.88 kg/m²   Patient Vitals for the past 4 hrs:   BP Temp Temp src Pulse Resp SpO2   10/12/22 1340 103/68 -- -- 66 12 94 %   10/12/22 1330 108/73 -- -- 57 13 99 %   10/12/22 1315 113/70 -- -- 58 15 100 %   10/12/22 1300 112/79 -- -- 85 19 100 %   10/12/22 1255 116/74 -- -- 60 17 100 %   10/12/22 1250 118/78 -- -- 66 16 100 %   10/12/22 1246 (!) 101/58 96.8 °F (36 °C) Temporal 69 12 99 %   10/12/22 1245 109/60 -- -- -- -- 98 %       Level of consciousness:  Awake, alert    Respiratory: Respirations easy, no distress. Stable. Cardiovascular: Hemodynamically stable. Hydration: Adequate. PONV: Adequately managed. Post-op pain: Adequately controlled. Post-op assessment: Tolerated anesthetic well without complication. Complications:  None.     Mellisa Parker MD  October 12, 2022   2:13 PM

## 2022-10-12 NOTE — DISCHARGE INSTR - DIET
Good nutrition is important when healing from an illness, injury, or surgery. Follow any nutrition recommendations given to you during your hospital stay. If you were given an oral nutrition supplement while in the hospital, continue to take this supplement at home. You can take it with meals, in-between meals, and/or before bedtime. These supplements can be purchased at most local grocery stores, pharmacies, and chain [employer]-stores. If you have any questions about your diet or nutrition, call the hospital and ask for the dietitian. Advance to regular diet as tolerated.

## 2022-10-12 NOTE — PROGRESS NOTES
Pt to PACU from OR. Pt asleep at arrival, oral airway in place. Pt placed on monitor, 100% on 3L via NC. VSS. Report obtained.

## 2022-10-12 NOTE — ANESTHESIA PRE PROCEDURE
[facility] Department of Anesthesiology  Pre-Anesthesia Evaluation/Consultation       Name:  Tova Sanchez  : 1985  Age:  44 y.o. MRN:  [id number]  Date: 10/12/2022           Surgeon: Surgeon(s):  Sofia Montejo MD    Procedure: Procedure(s):  RIGHT KNEE ARTHROSCOPY MEDIAL MENISCECTOMY     No Known Allergies  Patient Active Problem List   Diagnosis    AC separation, left, sequela    Lateral epicondylitis of both elbows    Arteriovenous malformation    Brain aneurysm    Chronic pain of right knee    Acute medial meniscus tear of right knee     Past Medical History:   Diagnosis Date    AVM (arteriovenous malformation) brain     Hyperlipidemia     NO MEDS    Wears contact lenses     Wears glasses      Past Surgical History:   Procedure Laterality Date    CYST REMOVAL Left     left wrist    WRIST SURGERY       Social History     Tobacco Use    Smoking status: Never    Smokeless tobacco: Never   Vaping Use    Vaping Use: Never used   Substance Use Topics    Alcohol use: Yes     Alcohol/week: 5.0 standard drinks     Types: 5 Cans of beer per week     Comment: SOCIALLY    Drug use: Never     Medications  No current facility-administered medications on file prior to encounter.      Current Outpatient Medications on File Prior to Encounter   Medication Sig Dispense Refill    finasteride (PROPECIA) 1 MG tablet Take 1 mg by mouth at bedtime      Minoxidil (MINOXIDIL FOR MEN) 5 % FOAM Apply topically 2 times daily APPLY TO SCALP      Multiple Vitamins-Minerals (MENS MULTIVITAMIN PO) Take 1 tablet by mouth daily      Glucosamine HCl (GLUCOSAMINE PO) Take 1 tablet by mouth daily (Patient not taking: No sig reported)       Current Facility-Administered Medications   Medication Dose Route Frequency Provider Last Rate Last Admin    sodium chloride flush 0.9 % injection 5-40 mL  5-40 mL IntraVENous 2 times per day Ishaan Lujan MD        sodium chloride flush 0.9 % injection 5-40 mL  5-40 mL IntraVENous PRN Teagan Manuel MD        0.9 % sodium chloride infusion   IntraVENous PRN Teagan Manuel MD        ceFAZolin (ANCEF) 2,000 mg in dextrose 5 % 50 mL IVPB (mini-bag)  2,000 mg IntraVENous Once Barbara Mustafa MD         Vital Signs (Current)   Vitals:    10/12/22 1013   BP: 111/70   Pulse: 67   Resp: 16   Temp: 97.7 °F (36.5 °C)   SpO2: 97%     Vital Signs Statistics (for past 48 hrs)     Temp  Av.7 °F (36.5 °C)  Min: 97.7 °F (36.5 °C)   Min taken time: 10/12/22 1013  Max: 97.7 °F (36.5 °C)   Max taken time: 10/12/22 1013  Pulse  Av  Min: 67   Min taken time: 10/12/22 1013  Max: 79   Max taken time: 10/12/22 1013  Resp  Av  Min: 16   Min taken time: 10/12/22 1013  Max: 16   Max taken time: 10/12/22 1013  BP  Min: 111/70   Min taken time: 10/12/22 1013  Max: 111/70   Max taken time: 10/12/22 1013  SpO2  Av %  Min: 97 %   Min taken time: 10/12/22 1013  Max: 97 %   Max taken time: 10/12/22 1013    BP Readings from Last 3 Encounters:   10/12/22 111/70   10/10/22 110/72   22 110/76     BMI  Body mass index is 25.88 kg/m². Estimated body mass index is 25.88 kg/m² as calculated from the following:    Height as of this encounter: 5' 9\" (1.753 m). Weight as of this encounter: 175 lb 4.3 oz (79.5 kg).     CBC   Lab Results   Component Value Date/Time    WBC 5.9 2022 08:49 AM    RBC 4.97 2022 08:49 AM    HGB 15.1 2022 08:49 AM    HCT 44.3 2022 08:49 AM    MCV 89.1 2022 08:49 AM    RDW 13.3 2022 08:49 AM     2022 08:49 AM     CMP    Lab Results   Component Value Date/Time     2022 08:49 AM    K 4.1 2022 08:49 AM     2022 08:49 AM    CO2 30 2022 08:49 AM    BUN 12 2022 08:49 AM    CREATININE 0.7 2022 08:49 AM    GFRAA >60 2022 08:49 AM    AGRATIO 2.1 2022 08:49 AM    LABGLOM >60 2022 08:49 AM    PROT 7.2 2022 08:49 AM    CALCIUM 10.0 08/24/2022 08:49 AM    BILITOT 0.7 08/24/2022 08:49 AM    ALKPHOS 77 08/24/2022 08:49 AM    AST 18 08/24/2022 08:49 AM    ALT 13 08/24/2022 08:49 AM     BMP    Lab Results   Component Value Date/Time     08/24/2022 08:49 AM    K 4.1 08/24/2022 08:49 AM     08/24/2022 08:49 AM    CO2 30 08/24/2022 08:49 AM    BUN 12 08/24/2022 08:49 AM    CREATININE 0.7 08/24/2022 08:49 AM    CALCIUM 10.0 08/24/2022 08:49 AM    GFRAA >60 08/24/2022 08:49 AM    LABGLOM >60 08/24/2022 08:49 AM     POCGlucose  No results for input(s): GLUCOSE in the last 72 hours. Coags  No results found for: PROTIME, INR, APTT  HCG (If Applicable) No results found for: PREGTESTUR, PREGSERUM, HCG, HCGQUANT   ABGs No results found for: PHART, PO2ART, BOT8FXH, ERD4UYI, BEART, F8JQKETP   Type & Screen (If Applicable)  No results found for: LABABO, LABRH                         BMI: Wt Readings from Last 3 Encounters:       NPO Status:   Date of last liquid consumption: 10/11/22   Time of last liquid consumption: 2030   Date of last solid food consumption: 10/11/22      Time of last solid consumption: 2030       Anesthesia Evaluation  Patient summary reviewed no history of anesthetic complications:   Airway: Mallampati: III  TM distance: >3 FB   Neck ROM: full  Mouth opening: > = 3 FB   Dental: normal exam         Pulmonary:Negative Pulmonary ROS and normal exam                               Cardiovascular:  Exercise tolerance: good (>4 METS),   (+) hyperlipidemia        Rhythm: regular  Rate: normal           Beta Blocker:  Not on Beta Blocker         Neuro/Psych:   Negative Neuro/Psych ROS              GI/Hepatic/Renal: Neg GI/Hepatic/Renal ROS       (-) GERD       Endo/Other: Negative Endo/Other ROS                    Abdominal:             Vascular:   + PVD, aortic or cerebral (Hx AVMs), . Other Findings:           Anesthesia Plan      general     ASA 3       Induction: intravenous.     MIPS: Postoperative opioids intended and Prophylactic antiemetics administered. Anesthetic plan and risks discussed with patient and spouse. Plan discussed with CRNA. This pre-anesthesia assessment may be used as a history and physical.    DOS STAFF ADDENDUM:    Pt seen and examined, chart reviewed (including anesthesia, drug and allergy history). No interval changes to history and physical examination. Anesthetic plan, risks, benefits, alternatives, and personnel involved discussed with patient. Questions and concerns addressed. Patient(family) verbalized an understanding and agrees to proceed.       Michelle Dover MD  October 12, 2022  10:25 AM

## 2022-10-12 NOTE — BRIEF OP NOTE
Brief Postoperative Note      Patient: Ihsan Trinidad  YOB: 1985  MRN: [id number]    Date of Procedure: 10/12/2022    Pre-Op Diagnosis: RIGHT KNEE MEDIAL MENISCUS TEAR    Post-Op Diagnosis: Same       Procedure(s):  RIGHT KNEE ARTHROSCOPY MEDIAL MENISCECTOMY    Surgeon(s):  Chin Linda MD    Assistant:  Surgical Assistant: Pender Safe  First Assistant: Smith Castillo    Anesthesia: General    Estimated Blood Loss (mL): Minimal    Complications: None    Specimens:   * No specimens in log *    Implants:  * No implants in log *      Drains: * No LDAs found *    Findings: medial meniscus tear      Electronically signed by Terra Aguillon MD on 10/12/2022 at 12:36 PM

## 2022-10-12 NOTE — FLOWSHEET NOTE
Patient and responsible adult verbalized understanding of discharge instructions, sedation medication, and potential complications including pain. Patient instructed to call Doctor if complications occur. Copy of Dr. Marisabel Blackwell discharge instructions obtained. Pt. Allowed 50% weight on operative leg. Given crutches. 3 prescriptions with pt's wife. She did not want to wait an hour to have them filled by the retail pharmacy here. Taken to discharge bridge in wheelchair. Pt wanted ibuprofen for pain med-they had some in the car. Pt. Had hiccups-given water to drink.

## 2022-10-13 ENCOUNTER — HOSPITAL ENCOUNTER (OUTPATIENT)
Dept: PHYSICAL THERAPY | Age: 37
Setting detail: THERAPIES SERIES
Discharge: HOME | End: 2022-10-13
Payer: COMMERCIAL

## 2022-10-13 ENCOUNTER — OFFICE VISIT (OUTPATIENT)
Dept: ORTHOPEDIC SURGERY | Age: 37
End: 2022-10-13

## 2022-10-13 VITALS — BODY MASS INDEX: 25.92 KG/M2 | HEIGHT: 69 IN | WEIGHT: 175 LBS

## 2022-10-13 DIAGNOSIS — M25.561 RIGHT KNEE PAIN, UNSPECIFIED CHRONICITY: Primary | ICD-10-CM

## 2022-10-13 DIAGNOSIS — S83.241D TRAUMATIC TEAR OF MEDIAL MENISCUS OF RIGHT KNEE, SUBSEQUENT ENCOUNTER: Primary | ICD-10-CM

## 2022-10-13 DIAGNOSIS — R29.898 WEAKNESS OF RIGHT LOWER EXTREMITY: ICD-10-CM

## 2022-10-13 PROCEDURE — 97530 THERAPEUTIC ACTIVITIES: CPT | Performed by: PHYSICAL THERAPIST

## 2022-10-13 PROCEDURE — 97161 PT EVAL LOW COMPLEX 20 MIN: CPT | Performed by: PHYSICAL THERAPIST

## 2022-10-13 PROCEDURE — 97110 THERAPEUTIC EXERCISES: CPT | Performed by: PHYSICAL THERAPIST

## 2022-10-13 PROCEDURE — 1040RET RETAIN PT ID & RECRUITMENT: Performed by: ORTHOPAEDIC SURGERY

## 2022-10-13 PROCEDURE — 1073RET COMPLETION OF WORKABILITY PRESCRIPTION: Performed by: ORTHOPAEDIC SURGERY

## 2022-10-13 PROCEDURE — 99024 POSTOP FOLLOW-UP VISIT: CPT | Performed by: ORTHOPAEDIC SURGERY

## 2022-10-13 PROCEDURE — 97016 VASOPNEUMATIC DEVICE THERAPY: CPT | Performed by: PHYSICAL THERAPIST

## 2022-10-13 NOTE — OP NOTE
[address] [facility]                                OPERATIVE REPORT    PATIENT NAME: Rory [place]                        :        1985  MED REC NO:   [id number]                          ROOM:  ACCOUNT NO:   [de-identified]                           ADMIT DATE: 10/12/2022  PROVIDER:     Mark Wilson MD    DATE OF PROCEDURE:  10/12/2022    PREOPERATIVE DIAGNOSIS:  Right knee medial meniscus tear. POSTOPERATIVE DIAGNOSIS:  Right knee medial meniscus tear, right knee  plica. OPERATION PERFORMED:  Arthroscopy of the right knee with partial medial  meniscectomy and resection of plica. SURGEON:  Mark Wilson MD    FIRST ASSISTANT:  Trip Michel MD    ANESTHESIA:  General.    ANTIBIOTICS:  Ancef. ESTIMATED BLOOD LOSS:  Minimal.    COMPLICATIONS:  None apparent. INDICATIONS FOR PROCEDURE:  The patient has had symptomatic right knee  pain with MRI scan showing medial meniscus tear. He was indicated for  surgery. Understanding the risks, benefits, and alternatives of the  operation, he was eager to proceed. OPERATIVE PROCEDURE:  The patient was identified in the preoperative  holding area. Informed consent was obtained. Operative site of the  right knee was marked by me with my initials. He was brought to the  operating room, placed under general anesthesia. Examination of the  right knee under anesthesia revealed stable complete motion. Right  lower extremity was prepped and draped in standard sterile fashion with  alcohol and ChloraPrep. Time-out confirmed appropriate patient,  positioning, operative site, and availability of instrumentation. Arthroscopy was commenced with the scope in the anterolateral portal.   An anteromedial working portal created from outside-in under needle  guidance. Findings as below:  1.   Grade 1 change at the patellofemoral joint with some grade 2 change  at the inferior aspect of the patella. 2.  A large inflamed medial plica. 3.  Unstable tear of the posterior horn of the medial meniscus extending  towards the mid body, but no displacement of the root, it was  irreparable. 4.  Grade 1 to 2 change medial femoral condyle and grade 1 to 2 change  medial tibial plateau. 5.  Normal ACL and PCL. 6.  Normal lateral compartment with just some grade 1 softening, but no  evidence of lateral meniscus tear. After thoroughly evaluating the entirety of the intraarticular space, a  partial medial meniscectomy was carried out with a basket forceps and a  suction shaver back to a stable edge. We then ablated and resected the  medial plica using the ArthroCare device and the full radius resector. At this point, the knee was evacuated of fluid. Portals were closed  with nylon. The knee was injected with 4 mL of 0.25% Marcaine with  epinephrine. The patient was then awoken and transported to Recovery  without complication.         Adele Aparicio MD    D: 10/12/2022 12:44:43       T: 10/12/2022 16:27:27     [id number]  Job#: [id number]     Doc#: [id number]    CC:

## 2022-10-13 NOTE — PLAN OF CARE
[address] [facility],[address], [address] [place] [address], [address] [place] [address]  Phone: [phone]   Fax: [phone]                                                              Physical Therapy Certification    Dear Ben Cordoba MD,    We had the pleasure of evaluating the following patient for physical therapy services at [address] [place] [address]. A summary of our findings can be found in the initial assessment below. This includes our plan of care. If you have any questions or concerns regarding these findings, please do not hesitate to contact me at the office phone number checked above. Thank you for the referral.       Physician Signature:_______________________________Date:__________________  By signing above (or electronic signature), therapists plan is approved by physician      Patient: Ivonne Newton   : 1985   MRN: [id number]  Referring Provider:  Ben Cordoba MD       Evaluation Date: 10/13/2022      Medical Diagnosis:  W86.629L (ICD-10-CM) - Traumatic tear of medial meniscus of right knee, subsequent encounter  Arthroscopy of the right knee with partial medial meniscectomy and resection of plica on . Treatment Diagnosis:    ICD-10-CM    1. Right knee pain, unspecified chronicity  M25.561       2. Weakness of right lower extremity  R29.898                                                                 Precautions/ Contra-indications: none    C-SSRS Triggered by Intake questionnaire (Past 2 wk assessment):   [x] No, Questionnaire did not trigger screening.   [] Yes, Patient intake triggered further evaluation      [] C-SSRS Screening completed  [] PCP notified via Plan of Care  [] Emergency services notified     Latex Allergy:  [x]NO      []YES  Preferred Language for Healthcare:   [x]English       []other:    SUBJECTIVE: Patient stated complaint: Pt presents s/p R knee medial meniscectomy on 10/12/22.  In April he was building a shed and had medial knee pain with kneeling, squatting and twisting. He would have pain at end-ranges of knee. He has been in [place] last three years to work on flexibility. He also had a pop medial knee with certain movements. Pt was sent for an MRI and then had surgery yesterday. No other major injuries to knee. 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 Likes to play tennis, lift weights, agility- jumping, do yoga, cycling. Also has a 11and 10year old boys he has to take care of. Living Status/Prior Level of Function: Independent with ADLs and IADLs, lifting weights, playing tennis, without pain in R knee prior to around April      OBJECTIVE:     Joint mobility:    []Normal    [x]Hypo- mild due to guarding and swelling   []Hyper    Palpation: tender with patellar mobs    Functional Mobility/Transfers: Mod I with crutches    Posture: WNL    Bandages/Dressings/Incisions: Bandages removed and recovered with tegaderm by MD Incisions are clean, dry, and intact without signs of infection. Negative Homans with non-tender calf and no warmth/tenderness observed.     Gait: (include devices/WB status)  Pt educated on 50% Wbing with B crutches and on going up and down step in clinic    Single leg stance: n/t    Squats:n/t    Quad recruitment: decreased R quad activation and difficulty maintaining contraction        Flexibility L R Comment   Hamstring  Fair- 90/90 position   Gastroc  Fair -     ITB      Quad   Heel to buttock distance             ROM PROM AROM Overpressure Comment    L R L R L R    Flexion  93 sheet pulls        Extension                                  Strength- deferred due to recent surgery L R Comment   Quad      Hamstring      Gastroc      Hip flexor      Hip ABD      Hip extension              Orthopedic Special Tests:  Special Test Results/Comment   Meniscal Click    Crepitus    Flexion Test    Valgus Laxity    Varus Laxity    Lachmans    Drop Back    Homans            Girth L R   Mid Patella     Suprapatellar     5cm above     15cm above                              [x] Patient history, allergies, meds reviewed. Medical chart reviewed. See intake form. Review Of Systems (ROS):  [x]Performed Review of systems (Integumentary, CardioPulmonary, Neurological) by intake and observation. Intake form has been scanned into medical record. Patient has been instructed to contact their primary care physician regarding ROS issues if not already being addressed at this time. [x] Patient history, allergies, meds reviewed. Medical chart reviewed. See intake form. Review Of Systems (ROS):  [x]Performed Review of systems (Integumentary, CardioPulmonary, Neurological) by intake and observation. Intake form has been scanned into medical record. Patient has been instructed to contact their primary care physician regarding ROS issues if not already being addressed at this time.       Co-morbidities/Complexities (which will affect course of rehabilitation):   [x]None           Arthritic conditions   []Rheumatoid arthritis (M05.9)  []Osteoarthritis (M19.91)   Cardiovascular conditions   []Hypertension (I10)  []Hyperlipidemia (E78.5)  []Angina pectoris (I20)  []Atherosclerosis (I70)   Musculoskeletal conditions   []Disc pathology   []Congenital spine pathologies   []Prior surgical intervention  []Osteoporosis (M81.8)  []Osteopenia (M85.8)   Endocrine conditions   []Hypothyroid (E03.9)  []Hyperthyroid Gastrointestinal conditions   []Constipation (K92.68)   Metabolic conditions   []Morbid obesity (E66.01)  []Diabetes type 1(E10.65) or 2 (E11.65)   []Neuropathy (G60.9)     Pulmonary conditions   []Asthma (J45)  []Coughing   []COPD (J44.9)   Psychological Disorders  []Anxiety (F41.9)  []Depression (F32.9)   []Other:   []Other:          Barriers to/and or personal factors that will affect rehab potential:              []Age  []Sex              []Motivation/Lack of Motivation                        []Co-Morbidities              []Cognitive Function, education/learning barriers              []Environmental, home barriers              []profession/work barriers  []past PT/medical experience  []other:  Justification:     Falls Risk Assessment (30 days):   [x] Falls Risk assessed and no intervention required. [] Falls Risk assessed and Patient requires intervention due to being higher risk   TUG score (>12s at risk):     [] Falls education provided, including         ASSESSMENT: Pt is a 44y.o. year old male s/p R knee medial meniscectomy on 10/12/22. Pt presents with decreased ROM; decreased strength; increased pain; decreased flexibility; gait requiring AD; and decreased functional mobility and ADLs. Pt will benefit from skilled physical therapy services to address above limitations through strengthening, stretching, ROM, gait and balance training, modalities as needed for pain control and swelling, instruction on HEP, and education on return to functional mobility.      Functional Impairments:     []Noted lumbar/proximal hip/LE joint hypomobility   [x]Decreased LE functional ROM   [x]Decreased core/proximal hip strength and neuromuscular control   [x]Decreased LE functional strength   [x]Reduced balance/proprioceptive control   []other:      Functional Activity Limitations (from functional questionnaire and intake)   [x]Reduced ability to tolerate prolonged functional positions   [x]Reduced ability or difficulty with changes of positions or transfers between positions   [x]Reduced ability to maintain good posture and demonstrate good body mechanics with sitting, bending, and lifting   [x]Reduced ability to sleep   [x] Reduced ability or tolerance with driving and/or computer work   [x]Reduced ability to perform lifting, carrying tasks   [x]Reduced ability to squat   [x]Reduced ability to forward bend   [x]Reduced ability to ambulate prolonged functional periods/distances/surfaces   [x]Reduced ability to ascend/descend stairs   [x]Reduced ability to run, hop, cut or jump   []other:    Participation Restrictions   [x]Reduced participation in self care activities   [x]Reduced participation in home management activities   [x]Reduced participation in work activities   [x]Reduced participation in social activities. [x]Reduced participation in sport/recreation activities. Classification :    [x]Signs/symptoms consistent with post-surgical status including decreased ROM, strength and function.    []Signs/symptoms consistent with joint sprain/strain   []Signs/symptoms consistent with patella-femoral syndrome   []Signs/symptoms consistent with knee OA/hip OA   []Signs/symptoms consistent with internal derangement of knee/Hip   []Signs/symptoms consistent with functional hip weakness/NMR control      []Signs/symptoms consistent with tendinitis/tendinosis    []signs/symptoms consistent with pathology which may benefit from Dry needling      []other:      Prognosis/Rehab Potential:      []Excellent   [x]Good    []Fair   []Poor    Tolerance of evaluation/treatment:    []Excellent   [x]Good    []Fair   []Poor    PLAN  Frequency/Duration:  2 days per week for 6 Weeks:  Interventions:  [x]  Therapeutic exercise including: strength training, ROM, for Lower extremity and core   [x]  NMR activation and proprioception for LE, Glutes and Core   [x]  Manual therapy as indicated for LE, Hip and spine to include: Dry Needling/IASTM, STM, PROM, Gr I-IV mobilizations, manipulation. [x] Modalities as needed that may include: thermal agents, E-stim, Biofeedback, US, iontophoresis as indicated  [x] Patient education on joint protection, postural re-education, activity modification, progressio      HEP instruction: Pt was instructed in, and safely and correctly demonstrated home exercise program. Patient verbalized understanding of proper frequency of exercises. Copy of exercises was scanned into patient chart and can be fount in the media file. Access Code: [email or web address]  URL: [email or web address]  Date: 10/13/2022  Prepared by: Vira Torre    Exercises  Seated Table Hamstring Stretch - 2 x daily - 7 x weekly - 1 sets - 5 reps - 30 hold  Long Sitting Calf Stretch with Strap - 2 x daily - 7 x weekly - 1 sets - 5 reps - 30 hold  Supine Heel Slide with Strap - 2 x daily - 7 x weekly - 1 sets - 10 reps - 10 hold  Supine Ankle Pumps - 6 x daily - 7 x weekly - 3 sets - 10 reps  Long Sitting Quad Set - 5 x daily - 7 x weekly - 1 sets - 10 reps - 10 hold  Supine Straight Leg Raises - 2 x daily - 7 x weekly - 3 sets - 10 reps  Sidelying Hip Abduction - 1 x daily - 7 x weekly - 3 sets - 10 reps  Supine Hip Adduction Isometric with Ball - 1 x daily - 7 x weekly - 1 sets - 10 reps - 10 hold    GOALS:  Patient stated goal: full range mobility, get back to tennis as soon as he can    [] Progressing: [] Met: [] Not Met: [] Adjusted    Therapist goals for Patient:   Short Term Goals: To be achieved in: 2 weeks  1. Independent in HEP and progression per patient tolerance, in order to prevent re-injury. [] Progressing: [] Met: [] Not Met: [] Adjusted   2. Patient will have a decrease in pain to facilitate improvement in movement, function, and ADLs as indicated by Functional Deficits.     [] Progressing: [] Met: [] Not Met: [] Adjusted    Long Term Goals: To be achieved in: 4-6 weeks  1. Functional index score of 43 or more for FOTO to assist with reaching prior level of function. [] Progressing: [] Met: [] Not Met: [] Adjusted  2. Patient will demonstrate increased R knee AROM to 0-130 to allow for proper joint functioning as indicated by patients Functional Deficits. [] Progressing: [] Met: [] Not Met: [] Adjusted  3. Patient will demonstrate an increase in R knee strength to 5/5 flex and extn in LE to allow for proper functional mobility as indicated by patients Functional Deficits. [] Progressing: [] Met: [] Not Met: [] Adjusted  4. Patient will return to community ambulation including going up and down steps without increased symptoms or restriction. [] Progressing: [] Met: [] Not Met: [] Adjusted  5. Pt will return to full work duties including carrying 40lbs, steps, and squatting without pain in knee. [] Progressing: [] Met: [] Not Met: [] Adjusted   6. Pt will return to running, cycling, and playing with kids without pain or limitation in R knee to be able to return to prior level of activity.    [] Progressing: [] Met: [] Not Met: [] Adjusted          Physical Therapy Evaluation Complexity Justification  [x] A history of present problem with:  [x] no personal factors and/or comorbidities that impact the plan of care;  []1-2 personal factors and/or comorbidities that impact the plan of care  []3 personal factors and/or comorbidities that impact the plan of care  [x] An examination of body systems using standardized tests and measures addressing any of the following: body structures and functions (impairments), activity limitations, and/or participation restrictions;:  [] a total of 1-2 or more elements   [] a total of 3 or more elements   [x] a total of 4 or more elements   [x] A clinical presentation with:  [x] stable and/or uncomplicated characteristics   [] evolving clinical presentation with changing characteristics  [] unstable and unpredictable characteristics;   [x] Clinical decision making of [x] low, [] moderate, [] high complexity using standardized patient assessment instrument and/or measurable assessment of functional outcome.     [x] EVAL (LOW) 14294 (typically 20 minutes face-to-face)  [] EVAL (MOD) 95572 (typically 30 minutes face-to-face)  [] EVAL (HIGH) 31362 (typically 45 minutes face-to-face)  [] RE-EVAL 30887      Electronically signed by:  Rubina Charles, PT, DPT

## 2022-10-13 NOTE — FLOWSHEET NOTE
[address], [address] [facility] [place], [address] [place]  Phone: [phone]   Fax: [phone]      Physical Therapy Daily Treatment Note    Date:  10/13/2022     Patient Name:  Sim Francisco    :  1985  MRN: [id number]    Medical Diagnosis:  G65.296S (ICD-10-CM) - Traumatic tear of medial meniscus of right knee, subsequent encounter   Arthroscopy of the right knee with partial medial meniscectomy and resection of plica on . Treatment Diagnosis:    ICD-10-CM    1. Right knee pain, unspecified chronicity  M25.561       2. Weakness of right lower extremity  R29.898         Insurance/Certification information:  PT Insurance Information: [facility]- 20 visits, no auth, $15 copay  Referring Provider:  Sarah Sam MD  Has the plan of care been signed (Y/N):        []  Yes  [x]  No     Date of Patient follow up with Physician: 10/20      Is this a Progress Report:     []  Yes  [x]  No        If Yes:  Date Range for reporting period:  Beginning- 10/13/2022   Ending    Progress report will be due (10 Rx or 30 days whichever is less): 10 visits or        Recertification will be due (POC Duration  / 90 days whichever is less): as above        Visit # Insurance Allowable Auth Required   1 20 []  Yes [x]  No        Functional Scale:  FOTO- 33    Date assessed:  10/13/2022      Latex Allergy:  [x]NO      []YES  Preferred Language for Healthcare:   [x]English       []other:      Pain level:  1/10  on 10/13/2022    SUBJECTIVE:  See eval    OBJECTIVE: See eval  Observation:   Test measurements:   Joint mobility:               []Normal                       [x]Hypo- mild due to guarding and swelling              []Hyper     Palpation: tender with patellar mobs     Functional Mobility/Transfers: Mod I with crutches     Posture:  WNL     Bandages/Dressings/Incisions: Bandages removed and recovered with tegaderm by MD Incisions are clean, dry, and intact without signs of infection. Negative Homans with non-tender calf and no warmth/tenderness observed.      Gait: (include devices/WB status)  Pt educated on 50% Wbing with B crutches and on going up and down step in clinic     Single leg stance: n/t     Squats:n/t     Quad recruitment: decreased R quad activation and difficulty maintaining contraction           Flexibility L R Comment   Hamstring   Fair- 90/90 position   Gastroc   Fair -      ITB         Quad     Heel to buttock distance                             ROM PROM AROM Overpressure Comment     L R L R L R     Flexion   93 sheet pulls             Extension                                                           Strength- deferred due to recent surgery L R Comment   Quad         Hamstring         Gastroc         Hip flexor         Hip ABD         Hip extension                         RESTRICTIONS/PRECAUTIONS: none    Exercises/Interventions:   Exercise/Equipment Resistance/Repetitions Other comments   Stretching     Hamstring 5 x 30 secs    Hip Flexion     ITB     Groin     Quad     Inclined Calf     Towel Pull 5  X 30 secs         SLR     Supine 3 x 10     Prone     Abduction 3 x 10     Adducton     SLR+     Clams                    Isometrics     Quad sets 10 x 10 secs    Hip Adduction 10 x 10 secs    Hamstring                    Patellar Glides     Medial     Superior     Inferior          ROM     Sheet Pulls 10 x 10 secs    Wall Slides     Edge of bed     Flexionator     Extensionator     Hang Weights     Ankle Pumps X 30                             CKC     Calf raises     Wall sits     Step ups     Step up and over     Lateral Step Downs          Single leg stance     Mini squats     CC TKE          Lateral band walks     Side Planks     Half moon     Single leg flow          Biodex-balance     Single leg stance     Plyoback          Stool scoots     SB bridge     SB HS Curls     Planks          PRE Extension  RANGE: 90/40   Flexion  RANGE: 0/90        Cable Column          Leg Press  RANGE: 70/10        Bike     Treadmill                 Patient education: Pt was educated on PT diagnosis, prognosis, and plan of care. Pt was educated on the use of ice throughout the day. Pt was educated on signs/symptoms of infection and DVT and to call with any questions or concerns. Pt educated on post-op dressings, FILIBERTO hose, and use of crutches/AD. Therapeutic Exercise and NMR EXR  [x] (53994) Provided verbal/tactile cueing for activities related to strengthening, flexibility, endurance, ROM for improvements in LE, proximal hip, and core control with self care, mobility, lifting, ambulation.  [] (14150) Provided verbal/tactile cueing for activities related to improving balance, coordination, kinesthetic sense, posture, motor skill, proprioception  to assist with LE, proximal hip, and core control in self care, mobility, lifting, ambulation and eccentric single leg control.      NMR and Therapeutic Activities:    [x] (33042 or 11544) Provided verbal/tactile cueing for activities related to improving balance, coordination, kinesthetic sense, posture, motor skill, proprioception and motor activation to allow for proper function of core, proximal hip and LE with self care and ADLs  [] (07219) Gait Re-education- Provided training and instruction to the patient for proper LE, core and proximal hip recruitment and positioning and eccentric body weight control with ambulation re-education including up and down stairs     Home Exercise Program:    [x] (92964) Reviewed/Progressed HEP activities related to strengthening, flexibility, endurance, ROM of core, proximal hip and LE for functional self-care, mobility, lifting and ambulation/stair navigation   [] (86691)Reviewed/Progressed HEP activities related to improving balance, coordination, kinesthetic sense, posture, motor skill, proprioception of core, proximal hip and LE for self care, mobility, lifting, and ambulation/stair navigation    Access Code: [email or web address]  URL: [email or web address]  Date: 10/13/2022  Prepared by: Anant Beverly     Exercises  Seated Table Hamstring Stretch - 2 x daily - 7 x weekly - 1 sets - 5 reps - 30 hold  Long Sitting Calf Stretch with Strap - 2 x daily - 7 x weekly - 1 sets - 5 reps - 30 hold  Supine Heel Slide with Strap - 2 x daily - 7 x weekly - 1 sets - 10 reps - 10 hold  Supine Ankle Pumps - 6 x daily - 7 x weekly - 3 sets - 10 reps  Long Sitting Quad Set - 5 x daily - 7 x weekly - 1 sets - 10 reps - 10 hold  Supine Straight Leg Raises - 2 x daily - 7 x weekly - 3 sets - 10 reps  Sidelying Hip Abduction - 1 x daily - 7 x weekly - 3 sets - 10 reps  Supine Hip Adduction Isometric with Ball - 1 x daily - 7 x weekly - 1 sets - 10 reps - 10 hold  Manual Treatments:  PROM / STM / Oscillations-Mobs:  G-I, II, III, IV (PA's, Inf., Post.)  [] (24661) Provided manual therapy to mobilize LE, proximal hip and/or LS spine soft tissue/joints for the purpose of modulating pain, promoting relaxation,  increasing ROM, reducing/eliminating soft tissue swelling/inflammation/restriction, improving soft tissue extensibility and allowing for proper ROM for normal function with self care, mobility, lifting and ambulation. Modalities:    [x] GAME READY (VASO)- for significant edema, swelling, pain control. Charges:  Timed Code Treatment Minutes: 27   Total Treatment Minutes: 58     [x] EVAL (LOW) 96563   [] EVAL (MOD) 32077   [] EVAL (HIGH) 72497   [] RE-EVAL   [x] WZ(19094) x   1  [] IONTO  [] NMR (17732) x     [x] VASO  [] Manual (86981) x      [] Other:  [x] TA x 1     [] Mech Traction (93078)  [] ES(attended) (87493)      [] ES (un) (62855):       GOALS: Patient stated goal: full range mobility, get back to tennis as soon as he can    [] Progressing: [] Met: [] Not Met: [] Adjusted     Therapist goals for Patient:   Short Term Goals:  To be achieved in: 2 weeks  1. Independent in HEP and progression per patient tolerance, in order to prevent re-injury. [] Progressing: [] Met: [] Not Met: [] Adjusted   2. Patient will have a decrease in pain to facilitate improvement in movement, function, and ADLs as indicated by Functional Deficits. [] Progressing: [] Met: [] Not Met: [] Adjusted     Long Term Goals: To be achieved in: 4-6 weeks  1. Functional index score of 43 or more for FOTO to assist with reaching prior level of function. [] Progressing: [] Met: [] Not Met: [] Adjusted  2. Patient will demonstrate increased R knee AROM to 0-130 to allow for proper joint functioning as indicated by patients Functional Deficits. [] Progressing: [] Met: [] Not Met: [] Adjusted  3. Patient will demonstrate an increase in R knee strength to 5/5 flex and extn in LE to allow for proper functional mobility as indicated by patients Functional Deficits. [] Progressing: [] Met: [] Not Met: [] Adjusted  4. Patient will return to community ambulation including going up and down steps without increased symptoms or restriction. [] Progressing: [] Met: [] Not Met: [] Adjusted  5. Pt will return to full work duties including carrying 40lbs, steps, and squatting without pain in knee. [] Progressing: [] Met: [] Not Met: [] Adjusted       6. Pt will return to running, cycling, and playing with kids without pain or limitation in R knee to be able to return to prior level of activity. [] Progressing: [] Met: [] Not Met: [] Adjusted                      Overall Progression Towards Functional goals/ Treatment Progress Update:  [] Patient is progressing as expected towards functional goals listed. [] Progression is slowed due to complexities/Impairments listed. [] Progression has been slowed due to co-morbidities.   [x] Plan just implemented, too soon to assess goals progression <30days   [] Goals require adjustment due to lack of progress  [] Patient is not progressing as expected and requires additional follow up with physician  [] Other    Prognosis for POC: [x] Good [] Fair  [] Poor      Patient requires continued skilled intervention: [x] Yes  [] No      ASSESSMENT:  See eval    Treatment/Activity Tolerance:  [x] Patient tolerated treatment well [] Patient limited by fatique  [] Patient limited by pain  [] Patient limited by other medical complications  [] Other:       Return to Play: (if applicable)   []  Stage 1: Intro to Strength   []  Stage 2: Return to Run and Strength   []  Stage 3: Return to Jump and Strength   []  Stage 4: Dynamic Strength and Agility   []  Stage 5: Sport Specific Training     []  Ready to Return to Play, Meets All Above Stages   []  Not Ready for Return to Sports   Comments:            Prognosis: [x] Good [] Fair  [] Poor    Patient Requires Follow-up: [x] Yes  [] No    PLAN: See eval; consider bike, 75% WBing, CC TKE, calf raises, bridges, hip add, hip extn  [] Continue per plan of care [] Alter current plan (see comments above)  [x] Plan of care initiated [] Hold pending MD visit [] Discharge    Note: If patient does not return for scheduled/ recommended follow up visits, this note will serve as a discharge from care along with most recent update on progress.      Electronically signed by: Lan Monk, PT, DPT

## 2022-10-13 NOTE — PROGRESS NOTES
aRhul Ceballos today 1 day status post right knee arthroscopy with partial medial meniscectomy. He is doing great and reports minimal pain. Today, incisions look good without infection. Calf is soft without DVT. Neurologic and vascular exams are normal.    I reviewed his arthroscopic photos with him.   He will start therapy and follow-up with me in a week for suture removal.

## 2022-10-18 ENCOUNTER — HOSPITAL ENCOUNTER (OUTPATIENT)
Dept: PHYSICAL THERAPY | Age: 37
Setting detail: THERAPIES SERIES
Discharge: HOME | End: 2022-10-18
Payer: COMMERCIAL

## 2022-10-18 PROCEDURE — 97112 NEUROMUSCULAR REEDUCATION: CPT | Performed by: PHYSICAL THERAPIST

## 2022-10-18 PROCEDURE — 97530 THERAPEUTIC ACTIVITIES: CPT | Performed by: PHYSICAL THERAPIST

## 2022-10-18 PROCEDURE — 97110 THERAPEUTIC EXERCISES: CPT | Performed by: PHYSICAL THERAPIST

## 2022-10-18 NOTE — FLOWSHEET NOTE
[address] [facility],[address], [address] [facility] [place], [place]  Phone: [phone]   Fax: [phone]      Physical Therapy Daily Treatment Note    Date:  10/18/2022     Patient Name:  Jack Johnson    :  1985  MRN: [id number]    Medical Diagnosis:  T85.737A (ICD-10-CM) - Traumatic tear of medial meniscus of right knee, subsequent encounter   Arthroscopy of the right knee with partial medial meniscectomy and resection of plica on . Treatment Diagnosis:    ICD-10-CM    1. Right knee pain, unspecified chronicity  M25.561       2. Weakness of right lower extremity  R29.898         Insurance/Certification information:  PT Insurance Information: [place]- 20 visits, no auth, $15 copay  Referring Provider:  Jeny Montalvo MD  Has the plan of care been signed (Y/N):        []  Yes  [x]  No     Date of Patient follow up with Physician: 10/20      Is this a Progress Report:     []  Yes  [x]  No        If Yes:  Date Range for reporting period:  Beginning- 10/13/2022   Ending    Progress report will be due (10 Rx or 30 days whichever is less): 10 visits or        Recertification will be due (POC Duration  / 90 days whichever is less): as above        Visit # Insurance Allowable Auth Required   2 20 []  Yes [x]  No        Functional Scale:  FOTO- 33    Date assessed:  10/13/2022      Latex Allergy:  [x]NO      []YES  Preferred Language for Healthcare:   [x]English       []other:      Pain level:  0/10  on 10/18/2022    SUBJECTIVE:  Pt states he has been feeling a little better every day. He states he has not been compliant with the crutches but really has not done anything to overly exert himself. He feels good walking around his house and short distances and wakes up in the morning feeling strong with no setbacks. The only time he feels like he has needed ice was after last week's PT session.  Pt did state he felt a stretch through the front of his knee cap during sheet slides and again during bridges but it was not painful. OBJECTIVE: See eval  Observation:   Test measurements:   Joint mobility:               []Normal                       [x]Hypo- mild due to guarding and swelling              []Hyper     Palpation: tender with patellar mobs     Functional Mobility/Transfers: Mod I with crutches     Posture: WNL     Bandages/Dressings/Incisions: Bandages removed and recovered with tegaderm by MD Incisions are clean, dry, and intact without signs of infection. Negative Homans with non-tender calf and no warmth/tenderness observed.      Gait: (include devices/WB status)  Pt educated on 50% Wbing with B crutches and on going up and down step in clinic     Single leg stance: n/t     Squats:n/t     Quad recruitment: decreased R quad activation and difficulty maintaining contraction           Flexibility L R Comment   Hamstring   Fair- 90/90 position   Gastroc   Fair -      ITB         Quad     Heel to buttock distance                             ROM PROM AROM Overpressure Comment     L R L R L R     Flexion   132  sheet pulls             Extension                                                           Strength- deferred due to recent surgery L R Comment   Quad         Hamstring         Gastroc         Hip flexor         Hip ABD         Hip extension                         RESTRICTIONS/PRECAUTIONS: none    Exercises/Interventions:   Exercise/Equipment Resistance/Repetitions Other comments   Stretching     Hamstring 5 x 30 secs    Hip Flexion     ITB     Groin     Quad     Inclined Calf     Towel Pull 5  X 30 secs              SLR     Supine 3 x 10     Prone 3 x 10    Abduction 3 x 10     Adducton 3 x 10     SLR+     Clams     bridges 3 x 10               Isometrics     Quad sets 10 x 10 secs    Hip Adduction 10 x 10 secs    Hamstring                    Patellar Glides     Medial     Superior     Inferior ROM     Sheet Pulls 10 x 10 secs    Wall Slides     Edge of bed     Flexionator     Extensionator     Hang Weights                                 CKC     Calf raises 3x10 BW   Wall sits     Step ups     Step up and over     Lateral Step Downs          Single leg stance     Mini squats     CC TKE 3x10  15# @ CC        Lateral band walks     Side Planks     Half moon     Single leg flow          Biodex-balance     Single leg stance     Plyoback          Stool scoots     SB bridge     SB HS Curls     Planks     Gait training 75% WBing with one crutch    PRE     Extension  RANGE: 90/40   Flexion  RANGE: 0/90        Cable Column          Leg Press  RANGE: 70/10        Bike     Treadmill     Nu step 5' min           Patient education: Pt was educated on PT diagnosis, prognosis, and plan of care. Pt was educated on the use of ice throughout the day. Pt was educated on signs/symptoms of infection and DVT and to call with any questions or concerns. Pt educated on post-op dressings, FILIBERTO hose, and use of crutches/AD. Therapeutic Exercise and NMR EXR  [x] (82104) Provided verbal/tactile cueing for activities related to strengthening, flexibility, endurance, ROM for improvements in LE, proximal hip, and core control with self care, mobility, lifting, ambulation.  [] (32789) Provided verbal/tactile cueing for activities related to improving balance, coordination, kinesthetic sense, posture, motor skill, proprioception  to assist with LE, proximal hip, and core control in self care, mobility, lifting, ambulation and eccentric single leg control.      NMR and Therapeutic Activities:    [x] (13445 or 09972) Provided verbal/tactile cueing for activities related to improving balance, coordination, kinesthetic sense, posture, motor skill, proprioception and motor activation to allow for proper function of core, proximal hip and LE with self care and ADLs  [] (50473) Gait Re-education- Provided training and instruction to the tissue extensibility and allowing for proper ROM for normal function with self care, mobility, lifting and ambulation. Modalities: ice    [] GAME READY (VASO)- for significant edema, swelling, pain control. Charges:  Timed Code Treatment Minutes: 40   Total Treatment Minutes: 47     [] EVAL (LOW) 05542   [] EVAL (MOD) 34428   [] EVAL (HIGH) 81591   [] RE-EVAL   [x] VL(61973) x   1  [] IONTO  [x] NMR (40841) x  1   [] VASO  [] Manual (07700) x      [] Other:  [x] TA x 1     [] Mech Traction (59559)  [] ES(attended) (84857)      [] ES (un) (44611):       GOALS: Patient stated goal: full range mobility, get back to tennis as soon as he can    [] Progressing: [] Met: [] Not Met: [] Adjusted     Therapist goals for Patient:   Short Term Goals: To be achieved in: 2 weeks  1. Independent in HEP and progression per patient tolerance, in order to prevent re-injury. [] Progressing: [] Met: [] Not Met: [] Adjusted   2. Patient will have a decrease in pain to facilitate improvement in movement, function, and ADLs as indicated by Functional Deficits. [] Progressing: [] Met: [] Not Met: [] Adjusted     Long Term Goals: To be achieved in: 4-6 weeks  1. Functional index score of 43 or more for FOTO to assist with reaching prior level of function. [] Progressing: [] Met: [] Not Met: [] Adjusted  2. Patient will demonstrate increased R knee AROM to 0-130 to allow for proper joint functioning as indicated by patients Functional Deficits. [] Progressing: [] Met: [] Not Met: [] Adjusted  3. Patient will demonstrate an increase in R knee strength to 5/5 flex and extn in LE to allow for proper functional mobility as indicated by patients Functional Deficits. [] Progressing: [] Met: [] Not Met: [] Adjusted  4. Patient will return to community ambulation including going up and down steps without increased symptoms or restriction. [] Progressing: [] Met: [] Not Met: [] Adjusted  5.  Pt will return to full work duties including carrying 40lbs, steps, and squatting without pain in knee. [] Progressing: [] Met: [] Not Met: [] Adjusted       6. Pt will return to running, cycling, and playing with kids without pain or limitation in R knee to be able to return to prior level of activity. [] Progressing: [] Met: [] Not Met: [] Adjusted                      Overall Progression Towards Functional goals/ Treatment Progress Update:  [] Patient is progressing as expected towards functional goals listed. [] Progression is slowed due to complexities/Impairments listed. [] Progression has been slowed due to co-morbidities. [x] Plan just implemented, too soon to assess goals progression <30days   [] Goals require adjustment due to lack of progress  [] Patient is not progressing as expected and requires additional follow up with physician  [] Other    Prognosis for POC: [x] Good [] Fair  [] Poor      Patient requires continued skilled intervention: [x] Yes  [] No      ASSESSMENT:  See eval    Treatment/Activity Tolerance:  [x] Patient tolerated treatment well [] Patient limited by fatique  [] Patient limited by pain  [] Patient limited by other medical complications  [x] Other: Educated patient on 75% WB status and staying compliant with 1 crutch to limit risk of adverse effects. Pt demos significantly improved knee flexion ROM since last visit. Progressed hip strengthening and began light closed kinetic chain TKE and calf raises as tolerated. Pt needed verbal cues to slow down reps during 4-way hip and focus on quality of reps instead of speed. Pt tolerated treatment session well.     Return to Play: (if applicable)   []  Stage 1: Intro to Strength   []  Stage 2: Return to Run and Strength   []  Stage 3: Return to Jump and Strength   []  Stage 4: Dynamic Strength and Agility   []  Stage 5: Sport Specific Training     []  Ready to Return to Play, Meets All Above Stages   []  Not Ready for Return to Sports   Comments: Prognosis: [x] Good [] Fair  [] Poor    Patient Requires Follow-up: [x] Yes  [] No    PLAN: See eval; FWB, mini squats, 1# ankle weight to 4-way SLR; discuss BFR with Dr and pt  [x] Continue per plan of care [] Alter current plan (see comments above)  [] Plan of care initiated [] Hold pending MD visit [] Discharge    Note: If patient does not return for scheduled/ recommended follow up visits, this note will serve as a discharge from care along with most recent update on progress. Electronically signed by: Therapist was present, directed the patient's care, made skilled judgement, and was responsible for assessment and treatment of the patient.  Russ Poe, SPT    Vito Marin, PT, DPT

## 2022-10-20 ENCOUNTER — HOSPITAL ENCOUNTER (OUTPATIENT)
Dept: PHYSICAL THERAPY | Age: 37
Setting detail: THERAPIES SERIES
Discharge: HOME | End: 2022-10-20
Payer: COMMERCIAL

## 2022-10-20 PROCEDURE — 97110 THERAPEUTIC EXERCISES: CPT | Performed by: PHYSICAL THERAPIST

## 2022-10-20 PROCEDURE — 97530 THERAPEUTIC ACTIVITIES: CPT | Performed by: PHYSICAL THERAPIST

## 2022-10-20 PROCEDURE — 97112 NEUROMUSCULAR REEDUCATION: CPT | Performed by: PHYSICAL THERAPIST

## 2022-10-20 NOTE — FLOWSHEET NOTE
[address] 76, [address] [place], [address]  Phone: [phone]   Fax: [phone]      Physical Therapy Daily Treatment Note    Date:  10/20/2022     Patient Name:  Rafa Bravo    :  1985  MRN: [id number]    Medical Diagnosis:  O97.258Y (ICD-10-CM) - Traumatic tear of medial meniscus of right knee, subsequent encounter   Arthroscopy of the right knee with partial medial meniscectomy and resection of plica on . Treatment Diagnosis:    ICD-10-CM    1. Right knee pain, unspecified chronicity  M25.561       2. Weakness of right lower extremity  R29.898         Insurance/Certification information:  PT Insurance Information: [place]- 20 visits, no auth, $15 copay  Referring Provider:  Chetan Parry MD  Has the plan of care been signed (Y/N):        []  Yes  [x]  No     Date of Patient follow up with Physician: 10/20      Is this a Progress Report:     []  Yes  [x]  No        If Yes:  Date Range for reporting period:  Beginning- 10/13/2022   Ending    Progress report will be due (10 Rx or 30 days whichever is less): 10 visits or 30       Recertification will be due (POC Duration  / 90 days whichever is less): as above        Visit # Insurance Allowable Auth Required   3 20 []  Yes [x]  No        Functional Scale:  FOTO- 33    Date assessed:  10/13/2022      Latex Allergy:  [x]NO      []YES  Preferred Language for Healthcare:   [x]English       []other:      Pain level:  0/10  on 10/20/2022    SUBJECTIVE: Pt states he felt good after last visit. Has continued to be complaint with his HEP. Yesterday he was experiencing a little tightness through the front of the knee during his HEP where his incision is, similar to what he was experiencing during last treatment session. He was sore yesterday following HEP and took ibuprofen but feels good today.     OBJECTIVE: See eval  Observation:   Test measurements:   Joint mobility:               []Normal                       [x]Hypo- mild due to guarding and swelling              []Hyper     Palpation: tender with patellar mobs     Functional Mobility/Transfers: mod I with crutches      Posture: WNL     Bandages/Dressings/Incisions: Bandages removed and recovered with tegaderm by MD Incisions are clean, dry, and intact without signs of infection. Negative Homans with non-tender calf and no warmth/tenderness observed.      Gait: (include devices/WB status)  Pt educated on 50% Wbing with B crutches and on going up and down step in clinic     Single leg stance: n/t     Squats:n/t     Quad recruitment: decreased R quad activation and difficulty maintaining contraction           Flexibility L R Comment   Hamstring   Fair- 90/90 position   Gastroc   Fair -      ITB         Quad     Heel to buttock distance                             ROM PROM AROM Overpressure Comment     L R- 10/20 L R L R     Flexion  153 138  sheet pulls             Extension                                                           Strength- deferred due to recent surgery L R Comment   Quad         Hamstring         Gastroc         Hip flexor         Hip ABD         Hip extension                         RESTRICTIONS/PRECAUTIONS: none    Exercises/Interventions:   Exercise/Equipment Resistance/Repetitions Other comments   Stretching     Hamstring 5 x 30 secs    Hip Flexion     ITB     Groin     Quad     Inclined Calf 5 x 30 secs                 SLR     Supine 3 x 10   1#    Prone 3 x 10   1#    Abduction 3 x 10   1#    Adducton 3 x 10   1#    SLR+     Clams     bridges 3 x 10  w/ ball squeeze             Isometrics     Quad sets 10 x 10 secs    Hip Adduction 10 x 10 secs    Hamstring                    Patellar Glides     Medial     Superior     Inferior          ROM     Sheet Pulls 10 x 10 secs    Wall Slides     Edge of bed     Flexionator     Extensionator     [employer] Weights                                 CKC     Calf raises 3x10 BW   Wall sits     Step ups     Step up and over     Lateral Step Downs          Single leg stance     Mini squats 3x10 Table at mid thigh   CC TKE 15# @ CC   TKE w/ march 3x10 Using ball @ wall        Lateral band walks     Side Planks     Half moon     Single leg flow          Biodex-balance     Single leg stance 3x30    Plyoback          Stool scoots     SB bridge     SB HS Curls     Planks     Gait training FWB    PRE     Extension  RANGE: 90/40   Flexion  RANGE: 0/90        Cable Column          Leg Press  RANGE: 70/10        Bike 5 min    Treadmill               Patient education: Pt was educated on PT diagnosis, prognosis, and plan of care. Pt was educated on the use of ice throughout the day. Pt was educated on signs/symptoms of infection and DVT and to call with any questions or concerns. Pt educated on post-op dressings, FILIBERTO hose, and use of crutches/AD. Therapeutic Exercise and NMR EXR  [x] (26931) Provided verbal/tactile cueing for activities related to strengthening, flexibility, endurance, ROM for improvements in LE, proximal hip, and core control with self care, mobility, lifting, ambulation.  [] (14417) Provided verbal/tactile cueing for activities related to improving balance, coordination, kinesthetic sense, posture, motor skill, proprioception  to assist with LE, proximal hip, and core control in self care, mobility, lifting, ambulation and eccentric single leg control.      NMR and Therapeutic Activities:    [x] (63947 or 92451) Provided verbal/tactile cueing for activities related to improving balance, coordination, kinesthetic sense, posture, motor skill, proprioception and motor activation to allow for proper function of core, proximal hip and LE with self care and ADLs  [] (17873) Gait Re-education- Provided training and instruction to the patient for proper LE, core and proximal hip recruitment and positioning and eccentric body weight control with ambulation re-education including up and down stairs     Home Exercise Program:    [x] (54217) Reviewed/Progressed HEP activities related to strengthening, flexibility, endurance, ROM of core, proximal hip and LE for functional self-care, mobility, lifting and ambulation/stair navigation   [] (51093)Reviewed/Progressed HEP activities related to improving balance, coordination, kinesthetic sense, posture, motor skill, proprioception of core, proximal hip and LE for self care, mobility, lifting, and ambulation/stair navigation    Access Code: [email or web address]  URL: [email or web address]  Date: 10/20/2022  Prepared by: Evon Labor    Exercises  Seated Table Hamstring Stretch - 2 x daily - 7 x weekly - 1 sets - 5 reps - 30 hold  Long Sitting Calf Stretch with Strap - 2 x daily - 7 x weekly - 1 sets - 5 reps - 30 hold  Supine Heel Slide with Strap - 2 x daily - 7 x weekly - 1 sets - 10 reps - 10 hold  Supine Ankle Pumps - 6 x daily - 7 x weekly - 3 sets - 10 reps  Long Sitting Quad Set - 5 x daily - 7 x weekly - 1 sets - 10 reps - 10 hold  Supine Straight Leg Raises - 2 x daily - 7 x weekly - 3 sets - 10 reps  Sidelying Hip Abduction - 1 x daily - 7 x weekly - 3 sets - 10 reps  Supine Hip Adduction Isometric with Ball - 1 x daily - 7 x weekly - 1 sets - 10 reps - 10 hold  Sidelying Hip Adduction - 1 x daily - 7 x weekly - 3 sets - 10 reps  Prone Hip Extension - 1 x daily - 7 x weekly - 3 sets - 10 reps  Standing Heel Raise - 1 x daily - 7 x weekly - 3 sets - 10 reps  Mini Squat - 1 x daily - 7 x weekly - 3 sets - 10 reps  Single Leg Stance - 1 x daily - 7 x weekly - 5 sets - 1 reps - 30 hold    Manual Treatments:  PROM / STM / Oscillations-Mobs:  G-I, II, III, IV (PA's, Inf., Post.)  [] (17378) Provided manual therapy to mobilize LE, proximal hip and/or LS spine soft tissue/joints for the purpose of modulating pain, promoting relaxation,  increasing ROM, reducing/eliminating soft tissue swelling/inflammation/restriction, improving soft tissue extensibility and allowing for proper ROM for normal function with self care, mobility, lifting and ambulation. Modalities: ice x 10 min   [] GAME READY (VASO)- for significant edema, swelling, pain control. Charges:  Timed Code Treatment Minutes: 48   Total Treatment Minutes: 58     [] EVAL (LOW) 85248   [] EVAL (MOD) 71575   [] EVAL (HIGH) 04942   [] RE-EVAL   [x] NK(19009) x   1  [] IONTO  [x] NMR (54226) x  1   [] VASO  [] Manual (22174) x      [] Other:  [x] TA x 1     [] Mech Traction (21591)  [] ES(attended) (03145)      [] ES (un) (99458):       GOALS: Patient stated goal: full range mobility, get back to tennis as soon as he can    [] Progressing: [] Met: [] Not Met: [] Adjusted     Therapist goals for Patient:   Short Term Goals: To be achieved in: 2 weeks  1. Independent in HEP and progression per patient tolerance, in order to prevent re-injury. [] Progressing: [] Met: [] Not Met: [] Adjusted   2. Patient will have a decrease in pain to facilitate improvement in movement, function, and ADLs as indicated by Functional Deficits. [] Progressing: [] Met: [] Not Met: [] Adjusted     Long Term Goals: To be achieved in: 4-6 weeks  1. Functional index score of 43 or more for FOTO to assist with reaching prior level of function. [] Progressing: [] Met: [] Not Met: [] Adjusted  2. Patient will demonstrate increased R knee AROM to 0-130 to allow for proper joint functioning as indicated by patients Functional Deficits. [] Progressing: [] Met: [] Not Met: [] Adjusted  3. Patient will demonstrate an increase in R knee strength to 5/5 flex and extn in LE to allow for proper functional mobility as indicated by patients Functional Deficits. [] Progressing: [] Met: [] Not Met: [] Adjusted  4. Patient will return to community ambulation including going up and down steps without increased symptoms or restriction.    [] Progressing: [] Met: [] Not Met: [] Adjusted  5. Pt will return to full work duties including carrying 40lbs, steps, and squatting without pain in knee. [] Progressing: [] Met: [] Not Met: [] Adjusted       6. Pt will return to running, cycling, and playing with kids without pain or limitation in R knee to be able to return to prior level of activity. [] Progressing: [] Met: [] Not Met: [] Adjusted                      Overall Progression Towards Functional goals/ Treatment Progress Update:  [] Patient is progressing as expected towards functional goals listed. [] Progression is slowed due to complexities/Impairments listed. [] Progression has been slowed due to co-morbidities. [x] Plan just implemented, too soon to assess goals progression <30days   [] Goals require adjustment due to lack of progress  [] Patient is not progressing as expected and requires additional follow up with physician  [] Other    Prognosis for POC: [x] Good [] Fair  [] Poor      Patient requires continued skilled intervention: [x] Yes  [] No      ASSESSMENT:  See eval    Treatment/Activity Tolerance:  [x] Patient tolerated treatment well [] Patient limited by fatique  [] Patient limited by pain  [] Patient limited by other medical complications  [x] Other:  Pt continues to demonstrate improved knee flexion ROM. Progressed hip strengthening by adding ball squeeze with bridge and 1# cuff weight to 4-way hip SLR. Pt demonstrated slight extensor lag with hip flexion SLR that was corrected with verbal cues to squeeze quad. Added mini squat and introduced balance training which pt tolerated without adverse effects. Progressed TKE with march to work on quad control during gait. Pt demos ability to maintain quad control on R LE with march during TKE and no limp upon gait assessment and was educated he could discharge crutches at this time.  Updated and reviewed HEP    Return to Play: (if applicable)   []  Stage 1: Intro to Strength   []  Stage 2: Return to Run and Strength   []  Stage 3: Return to Jump and Strength   []  Stage 4: Dynamic Strength and Agility   []  Stage 5: Sport Specific Training     []  Ready to Return to Play, Meets All Above Stages   []  Not Ready for Return to Sports   Comments:            Prognosis: [x] Good [] Fair  [] Poor    Patient Requires Follow-up: [x] Yes  [] No    PLAN: See eval; standing 4-way hip (continue hip flexion SLR), step ups to short step, single limb stance with foam; discuss BFR with Dr and consider initiate in PT. [x] Continue per plan of care [] Alter current plan (see comments above)  [] Plan of care initiated [] Hold pending MD visit [] Discharge    Note: If patient does not return for scheduled/ recommended follow up visits, this note will serve as a discharge from care along with most recent update on progress. Electronically signed by: Therapist was present, directed the patient's care, made skilled judgement, and was responsible for assessment and treatment of the patient.  Francisco Preston, SPT    Nik Blakely, PT, DPT

## 2022-10-22 ENCOUNTER — OFFICE VISIT (OUTPATIENT)
Dept: ORTHOPEDIC SURGERY | Age: 37
End: 2022-10-22

## 2022-10-22 DIAGNOSIS — S83.241D TRAUMATIC TEAR OF MEDIAL MENISCUS OF RIGHT KNEE, SUBSEQUENT ENCOUNTER: Primary | ICD-10-CM

## 2022-10-22 PROCEDURE — 99024 POSTOP FOLLOW-UP VISIT: CPT | Performed by: ORTHOPAEDIC SURGERY

## 2022-10-22 NOTE — PROGRESS NOTES
Annabel Tinajero returns today status post right knee arthroscopy with partial medial meniscectomy. He is doing really well and reports no pain. His only issue is that he developed shingles. Today, right knee looks great. He has no effusion. He has no tenderness. Incisions are healing nicely. Calf is soft without DVT    I removed his sutures and placed new Steri-Strips and Tegaderm. Will continue with rehab.   Follow-up with me in about 5 weeks

## 2022-10-25 ENCOUNTER — HOSPITAL ENCOUNTER (OUTPATIENT)
Dept: PHYSICAL THERAPY | Age: 37
Setting detail: THERAPIES SERIES
Discharge: HOME | End: 2022-10-25
Payer: COMMERCIAL

## 2022-10-25 PROCEDURE — 97112 NEUROMUSCULAR REEDUCATION: CPT | Performed by: PHYSICAL THERAPIST

## 2022-10-25 PROCEDURE — 97110 THERAPEUTIC EXERCISES: CPT | Performed by: PHYSICAL THERAPIST

## 2022-10-25 PROCEDURE — 97530 THERAPEUTIC ACTIVITIES: CPT | Performed by: PHYSICAL THERAPIST

## 2022-10-25 NOTE — FLOWSHEET NOTE
[address], [address] [facility], [place], [place]  Phone: [phone]   Fax: [phone]      Physical Therapy Daily Treatment Note    Date:  10/25/2022     Patient Name:  Jonathan Casper    :  1985  MRN: [id number]    Medical Diagnosis:  A21.869T (ICD-10-CM) - Traumatic tear of medial meniscus of right knee, subsequent encounter   Arthroscopy of the right knee with partial medial meniscectomy and resection of plica on . Treatment Diagnosis:    ICD-10-CM    1. Right knee pain, unspecified chronicity  M25.561       2. Weakness of right lower extremity  R29.898         Insurance/Certification information:  PT Insurance Information: [facility]- 20 visits, no auth, $15 copay  Referring Provider:  Janeth Forbes MD  Has the plan of care been signed (Y/N):        [x]  Yes  []  No     Date of Patient follow up with Physician: 10/20      Is this a Progress Report:     []  Yes  [x]  No        If Yes:  Date Range for reporting period:  Beginning- 10/13/2022   Ending    Progress report will be due (10 Rx or 30 days whichever is less): 10 visits or        Recertification will be due (POC Duration  / 90 days whichever is less): as above        Visit # Insurance Allowable Auth Required   4 20 []  Yes [x]  No        Functional Scale:  FOTO- 33    Date assessed:  10/13/2022      Latex Allergy:  [x]NO      []YES  Preferred Language for Healthcare:   [x]English       []other:      Pain level:  0/10  on 10/25/2022    SUBJECTIVE:   Pt states he feels pretty good since last visit. He has been walking a lot and feels fatigued by the end of the day. He recently noticed a weird sensation on the outside of the R knee that feels like a \"bruise\".  Pt reports it is only sensitive to the touch but feels like a sharp pain (3/10) when pressure is applied, denies pain/discomfort with weight-bearing, walking, and any other activities. Pt also reports a little bit of medial hamstring discomfort at the end of the day that resides by the time he wakes up in the morning. OBJECTIVE: See eval  Observation:   Test measurements:   Joint mobility:               []Normal                       []Hypo-               []Hyper     Palpation: tender with patellar mobs     Functional Mobility/Transfers: mod I with crutches      Posture: WNL     Bandages/Dressings/Incisions: . Negative Homans with non-tender calf and no warmth/tenderness observed.      Gait: (include devices/WB status)  full Wbing and non-antalgic -10/25     Single leg stance: n/t     Squats:n/t     Quad recruitment: decreased R quad activation and difficulty maintaining contraction           Flexibility L R Comment   Hamstring   Fair- 90/90 position   Gastroc   Fair -      ITB         Quad     Heel to buttock distance                             ROM PROM AROM Overpressure Comment     L R- 10/20 L R L R     Flexion  153 138  sheet pulls             Extension                                                           Strength- deferred due to recent surgery L R Comment   Quad         Hamstring         Gastroc         Hip flexor         Hip ABD         Hip extension                         RESTRICTIONS/PRECAUTIONS: none    Exercises/Interventions:   Exercise/Equipment Resistance/Repetitions Other comments   Stretching     Hamstring 5 x 30 secs    Hip Flexion     ITB     Groin     Quad     Inclined Calf 5 x 30 secs                 SLR     Supine Done with BFR   Prone    Abduction    Adducton    SLR+     Clams     bridges           Isometrics     Quad sets 5 x 10 secs    Hip Adduction    Hamstring                    Patellar Glides     Medial     Superior     Inferior          ROM     Sheet Pulls 5 x 10 secs    Wall Slides     Edge of bed     Flexionator     Extensionator     Hang Weights                                 CKC     Standing hip abduction  1x10 B Plinth support Calf raises 2x10 BW - plinth support    Wall sits     Step ups     Step up and over     Lateral Step Downs          Single leg stance     Mini squats Done with BFR   CC TKE 15# @ CC   TKE w/ march Using ball @ wall        Lateral band walks     Side Planks     Half moon     Single leg flow          Biodex-balance     Single leg stance [id number] Foam pad; high knee position    Plyoback          Stool scoots     SB bridge     SB HS Curls     Planks     Gait training FWB    PRE     Extension  RANGE: 90/40   Flexion  RANGE: 0/90        Cable Column          Leg Press 2 x 10 120#, DL RANGE: 70/10        Bike 5 min    Treadmill                   Blood Flow Restriction Training  BFR device: Smart Cuff  Cuff Color: medium cuff  LOP: 189 mmHg  PTP: 151 mmHg (80% LOP)  Exercises Reps Rest: Reps Rest: Reps Rest:  Reps Notes     Completed / Goal                Supine SLR 30/30  [x]    30 sec  []  1 min  15/15  [x]    30 sec  []     1 min  15/15  [x]    30 sec  [] 1 min  15/15 Verbal cues to slow down reps to reach 8min goal     Mini Squats  30/30 [x]    30 sec  []  1 min 15/15 [x]    30 sec  []     1 min 15/15 [x]    30 sec  [] 1 min 15/15 Foam pad on chair      /30 [x]    30 sec  []  1 min /15 [x]    30 sec     []     1 min /15 [x]    30 sec  [] 1 min /15      /30 [x]    30 sec  []  1 min /15 [x]    30 sec     []     1 min /15 [x]    30 sec  [] 1 min /15    BFR protocol with Reps of 30/15/15/15 with 30-60 seconds rest in between sets. The cuff was deflated every 8 minutes for 2 minutes of reperfusion. Patient was fully educated on Blood Flow Restriction (BFR) protocol this visit including education about what symptoms to monitor for while performing BFR and were instructed to immediately stop BFR and release pressure if they experience any numbness/tingling in extremity, intense pain beneath cuff, loss of sensation in extremity or feeling of coldness in extremity.  The patient has been medically cleared by MD for initiation of BFR therapy and verbal consent was obtained from patient/patient's guardian prior to initiation of BFR therapy. Patient education: Pt was educated on PT diagnosis, prognosis, and plan of care. Pt was educated on the use of ice throughout the day. Pt was educated on signs/symptoms of infection and DVT and to call with any questions or concerns. Pt educated on post-op dressings, FILIBERTO hose, and use of crutches/AD. Therapeutic Exercise and NMR EXR  [x] (09896) Provided verbal/tactile cueing for activities related to strengthening, flexibility, endurance, ROM for improvements in LE, proximal hip, and core control with self care, mobility, lifting, ambulation.  [] (13629) Provided verbal/tactile cueing for activities related to improving balance, coordination, kinesthetic sense, posture, motor skill, proprioception  to assist with LE, proximal hip, and core control in self care, mobility, lifting, ambulation and eccentric single leg control.      NMR and Therapeutic Activities:    [x] (25627 or 93019) Provided verbal/tactile cueing for activities related to improving balance, coordination, kinesthetic sense, posture, motor skill, proprioception and motor activation to allow for proper function of core, proximal hip and LE with self care and ADLs  [] (97438) Gait Re-education- Provided training and instruction to the patient for proper LE, core and proximal hip recruitment and positioning and eccentric body weight control with ambulation re-education including up and down stairs     Home Exercise Program:    [x] (30329) Reviewed/Progressed HEP activities related to strengthening, flexibility, endurance, ROM of core, proximal hip and LE for functional self-care, mobility, lifting and ambulation/stair navigation   [] (14050)Reviewed/Progressed HEP activities related to improving balance, coordination, kinesthetic sense, posture, motor skill, proprioception of core, proximal hip and LE for self care, mobility, lifting, and ambulation/stair navigation    Access Code: [email or web address]  URL: [email or web address]  Date: 10/20/2022  Prepared by: Andre Perry    Exercises  Seated Table Hamstring Stretch - 2 x daily - 7 x weekly - 1 sets - 5 reps - 30 hold  Long Sitting Calf Stretch with Strap - 2 x daily - 7 x weekly - 1 sets - 5 reps - 30 hold  Supine Heel Slide with Strap - 2 x daily - 7 x weekly - 1 sets - 10 reps - 10 hold  Supine Ankle Pumps - 6 x daily - 7 x weekly - 3 sets - 10 reps  Long Sitting Quad Set - 5 x daily - 7 x weekly - 1 sets - 10 reps - 10 hold  Supine Straight Leg Raises - 2 x daily - 7 x weekly - 3 sets - 10 reps  Sidelying Hip Abduction - 1 x daily - 7 x weekly - 3 sets - 10 reps  Supine Hip Adduction Isometric with Ball - 1 x daily - 7 x weekly - 1 sets - 10 reps - 10 hold  Sidelying Hip Adduction - 1 x daily - 7 x weekly - 3 sets - 10 reps  Prone Hip Extension - 1 x daily - 7 x weekly - 3 sets - 10 reps  Standing Heel Raise - 1 x daily - 7 x weekly - 3 sets - 10 reps  Mini Squat - 1 x daily - 7 x weekly - 3 sets - 10 reps  Single Leg Stance - 1 x daily - 7 x weekly - 5 sets - 1 reps - 30 hold    Manual Treatments:  PROM / STM / Oscillations-Mobs:  G-I, II, III, IV (PA's, Inf., Post.)  [] (37197) Provided manual therapy to mobilize LE, proximal hip and/or LS spine soft tissue/joints for the purpose of modulating pain, promoting relaxation,  increasing ROM, reducing/eliminating soft tissue swelling/inflammation/restriction, improving soft tissue extensibility and allowing for proper ROM for normal function with self care, mobility, lifting and ambulation. Modalities: ice x 10 min   [] GAME READY (VASO)- for significant edema, swelling, pain control.      Charges:  Timed Code Treatment Minutes: 50   Total Treatment Minutes: 65     [] EVAL (LOW) 62904   [] EVAL (MOD) 37301   [] EVAL (HIGH) 38091   [] RE-EVAL   [x] UF(56084) x   1  [] IONTO  [x] NMR (23722) x  1   [] VASO  [] Manual (20154) x      [] Other:  [x] TA x 1     [] [facility] (20998)  [] ES(attended) (30128)      [] ES (un) (48640):       GOALS: Patient stated goal: full range mobility, get back to tennis as soon as he can    [] Progressing: [] Met: [] Not Met: [] Adjusted     Therapist goals for Patient:   Short Term Goals: To be achieved in: 2 weeks  1. Independent in HEP and progression per patient tolerance, in order to prevent re-injury. [] Progressing: [] Met: [] Not Met: [] Adjusted   2. Patient will have a decrease in pain to facilitate improvement in movement, function, and ADLs as indicated by Functional Deficits. [] Progressing: [] Met: [] Not Met: [] Adjusted     Long Term Goals: To be achieved in: 4-6 weeks  1. Functional index score of 43 or more for FOTO to assist with reaching prior level of function. [] Progressing: [] Met: [] Not Met: [] Adjusted  2. Patient will demonstrate increased R knee AROM to 0-130 to allow for proper joint functioning as indicated by patients Functional Deficits. [] Progressing: [] Met: [] Not Met: [] Adjusted  3. Patient will demonstrate an increase in R knee strength to 5/5 flex and extn in LE to allow for proper functional mobility as indicated by patients Functional Deficits. [] Progressing: [] Met: [] Not Met: [] Adjusted  4. Patient will return to community ambulation including going up and down steps without increased symptoms or restriction. [] Progressing: [] Met: [] Not Met: [] Adjusted  5. Pt will return to full work duties including carrying 40lbs, steps, and squatting without pain in knee. [] Progressing: [] Met: [] Not Met: [] Adjusted       6. Pt will return to running, cycling, and playing with kids without pain or limitation in R knee to be able to return to prior level of activity.    [] Progressing: [] Met: [] Not Met: [] Adjusted                      Overall Progression Towards Functional goals/ Treatment Progress Update:  [] Patient is progressing as expected towards functional goals listed. [] Progression is slowed due to complexities/Impairments listed. [] Progression has been slowed due to co-morbidities. [x] Plan just implemented, too soon to assess goals progression <30days   [] Goals require adjustment due to lack of progress  [] Patient is not progressing as expected and requires additional follow up with physician  [] Other    Prognosis for POC: [x] Good [] Fair  [] Poor      Patient requires continued skilled intervention: [x] Yes  [] No      ASSESSMENT:  See eval    Treatment/Activity Tolerance:  [x] Patient tolerated treatment well [] Patient limited by fatique  [] Patient limited by pain  [] Patient limited by other medical complications  [x] Other:  Pt presents to clinic with slight lateral knee and hamstring discomfort likely due to overuse after discharging crutches last visit. Pt educated on proper stretching and easing back into activity to allow muscles to build endurance to activity. Pt continues to demonstrate improved knee ROM, but still lacking compared to L knee. Began BFR training which appropriately challenged pt and pt demonstrated good tolerance to. Progressed exercises to standing to work on functional strength. Added leg press as this exercise will likely become part of BFR training in future .     Return to Play: (if applicable)   []  Stage 1: Intro to Strength   []  Stage 2: Return to Run and Strength   []  Stage 3: Return to Jump and Strength   []  Stage 4: Dynamic Strength and Agility   []  Stage 5: Sport Specific Training     []  Ready to Return to Play, Meets All Above Stages   []  Not Ready for Return to Sports   Comments:            Prognosis: [x] Good [] Fair  [] Poor    Patient Requires Follow-up: [x] Yes  [] No    PLAN: See shalonda; standing 4-way hip (continue hip flexion SLR), step ups vs heel taps to short step, HS curl machine  [x] Continue per plan of care [] Alter current plan (see comments above)  [] Plan of care initiated [] Hold pending MD visit [] Discharge    Note: If patient does not return for scheduled/ recommended follow up visits, this note will serve as a discharge from care along with most recent update on progress. Electronically signed by: Therapist was present, directed the patient's care, made skilled judgement, and was responsible for assessment and treatment of the patient.  Nasreen Hodges, SPT    Shane Sandoval, PT, DPT

## 2022-10-28 ENCOUNTER — HOSPITAL ENCOUNTER (OUTPATIENT)
Dept: PHYSICAL THERAPY | Age: 37
Setting detail: THERAPIES SERIES
Discharge: HOME | End: 2022-10-28
Payer: COMMERCIAL

## 2022-10-28 PROCEDURE — 97140 MANUAL THERAPY 1/> REGIONS: CPT | Performed by: PHYSICAL THERAPIST

## 2022-10-28 PROCEDURE — 97110 THERAPEUTIC EXERCISES: CPT | Performed by: PHYSICAL THERAPIST

## 2022-10-28 PROCEDURE — 97112 NEUROMUSCULAR REEDUCATION: CPT | Performed by: PHYSICAL THERAPIST

## 2022-10-28 PROCEDURE — 97530 THERAPEUTIC ACTIVITIES: CPT | Performed by: PHYSICAL THERAPIST

## 2022-10-28 NOTE — FLOWSHEET NOTE
[address], [address] [facility] [place], [address]  Phone: [phone]   Fax: [phone]      Physical Therapy Daily Treatment Note    Date:  10/28/2022     Patient Name:  Juan Daniel Nunez    :  1985  MRN: [id number]    Medical Diagnosis:  G96.448B (ICD-10-CM) - Traumatic tear of medial meniscus of right knee, subsequent encounter   Arthroscopy of the right knee with partial medial meniscectomy and resection of plica on . Treatment Diagnosis:    ICD-10-CM    1. Right knee pain, unspecified chronicity  M25.561       2. Weakness of right lower extremity  R29.898         Insurance/Certification information:  PT Insurance Information: [facility]- 20 visits, no auth, $15 copay  Referring Provider:  Denice Barrios MD  Has the plan of care been signed (Y/N):        [x]  Yes  []  No     Date of Patient follow up with Physician: 10/20      Is this a Progress Report:     []  Yes  [x]  No        If Yes:  Date Range for reporting period:  Beginning- 10/13/2022   Ending    Progress report will be due (10 Rx or 30 days whichever is less): 10 visits or        Recertification will be due (POC Duration  / 90 days whichever is less): as above        Visit # Insurance Allowable Auth Required   5 20 []  Yes [x]  No        Functional Scale:  FOTO- 33    Date assessed:  10/13/2022      Latex Allergy:  [x]NO      []YES  Preferred Language for Healthcare:   [x]English       []other:      Pain level:  0/10  on 10/28/2022    SUBJECTIVE: Pt reports that he is feeling tight and noticed a little swelling yesterday that seems to have gone down today. Mentions being on his feet a lot over the past couple days, especially yesterday bringing his kids to trunk-or-treat. Still is experiencing that \"bruise\" like sensation on the outside of his knee but the back of his knee feels tight and is his main complaint.  Has been rolling out his IT band. Overall, still feels strong and symptoms this morning are not bad, 0/10 pain. OBJECTIVE: See eval  Observation:   Test measurements:   Joint mobility:               []Normal                       []Hypo-               []Hyper     Palpation: tender with patellar mobs     Functional Mobility/Transfers: mod I with crutches      Posture: WNL     Bandages/Dressings/Incisions: . Negative Homans with non-tender calf and no warmth/tenderness observed.      Gait: (include devices/WB status)  full Wbing and non-antalgic -10/25     Single leg stance: n/t     Squats:n/t     Quad recruitment: decreased R quad activation and difficulty maintaining contraction           Flexibility L R Comment   Hamstring   Fair- 90/90 position   Gastroc   Fair -      ITB         Quad     Heel to buttock distance                             ROM PROM AROM Overpressure Comment     L R- 10/20 L R L R     Flexion  153 138  sheet pulls             Extension                                                           Strength- deferred due to recent surgery L R Comment   Quad         Hamstring         Gastroc         Hip flexor         Hip ABD         Hip extension                         RESTRICTIONS/PRECAUTIONS: none    Exercises/Interventions:   Exercise/Equipment Resistance/Repetitions Other comments   Stretching     Hamstring 3 x 30 secs    Hip Flexion     ITB     Groin     Quad     Inclined Calf 3 x 30 secs                 SLR     Supine Done with BFR   Prone    Abduction    Adducton    SLR+     Clams     bridges 2 x 10            Isometrics     Quad sets    Hip Adduction    Hamstring                    Patellar Glides     Medial     Superior     Inferior          ROM     Sheet Pulls    Wall Slides     Edge of bed     Flexionator     Extensionator     Hang Weights                                 CKC     Standing hip abduction  2x10 B Plinth support         Calf raises Wall sits     Step ups     Step up and over     Lateral Step Downs          Single leg stance     Mini squats Done with BFR   CC TKE 15# @ CC   TKE w/ march Using ball @ wall        Lateral band walks     Side Planks     Half moon     Single leg flow          Biodex-balance     Single leg stance [id number] Foam pad; high knee position    Plyoback          Stool scoots     SB bridge     SB HS Curls     Planks     Gait training FWB    PRE     Extension  RANGE: 90/40   Flexion 2 x 10 44 lbs, slow eccentric RANGE: 0/90        Cable Column          Leg Press Done with BFR        Bike    Treadmill             Manual Therapy     CFM to medial hamstring (semitendinosus)  tendon, STM to proximal calf with TrP release  10'                    Blood Flow Restriction Training  BFR device: Smart Cuff  Cuff Color: medium cuff  LOP: 207 mmHg  PTP: 166 mmHg (80% LOP)  Exercises Reps Rest: Reps Rest: Reps Rest:  Reps Notes     Completed / Goal                Supine SLR 30/30  [x]    30 sec  []  1 min  15/15  [x]    30 sec  []     1 min  15/15  [x]    30 sec  [] 1 min  15/15 Verbal cues to slow down reps to reach 8min goal    Leg press 100# 30/30 [x]    30 sec  []  1 min 15/15 [x]    30 sec  []     1 min 15/15 [x]    30 sec  [] 1 min 15/15     LAQ 30/30 [x]    30 sec  []  1 min 15/15 [x]    30 sec     []     1 min 15/15 [x]    30 sec  [] 1 min 15/15 2 sec hold each rep     /30 [x]    30 sec  []  1 min /15 [x]    30 sec     []     1 min /15 [x]    30 sec  [] 1 min /15    BFR protocol with Reps of 30/15/15/15 with 30-60 seconds rest in between sets. The cuff was deflated every 8 minutes for 2 minutes of reperfusion. Patient was fully educated on Blood Flow Restriction (BFR) protocol this visit including education about what symptoms to monitor for while performing BFR and were instructed to immediately stop BFR and release pressure if they experience any numbness/tingling in extremity, intense pain beneath cuff, loss of sensation in extremity or feeling of coldness in extremity.  The patient has been medically cleared by MD for initiation of BFR therapy and verbal consent was obtained from patient/patient's guardian prior to initiation of BFR therapy. Patient education: Pt was educated on PT diagnosis, prognosis, and plan of care. Pt was educated on the use of ice throughout the day. Pt was educated on signs/symptoms of infection and DVT and to call with any questions or concerns. Pt educated on post-op dressings, FILIBERTO hose, and use of crutches/AD. Therapeutic Exercise and NMR EXR  [x] (21282) Provided verbal/tactile cueing for activities related to strengthening, flexibility, endurance, ROM for improvements in LE, proximal hip, and core control with self care, mobility, lifting, ambulation.  [] (10168) Provided verbal/tactile cueing for activities related to improving balance, coordination, kinesthetic sense, posture, motor skill, proprioception  to assist with LE, proximal hip, and core control in self care, mobility, lifting, ambulation and eccentric single leg control.      NMR and Therapeutic Activities:    [x] (43182 or 93326) Provided verbal/tactile cueing for activities related to improving balance, coordination, kinesthetic sense, posture, motor skill, proprioception and motor activation to allow for proper function of core, proximal hip and LE with self care and ADLs  [] (04792) Gait Re-education- Provided training and instruction to the patient for proper LE, core and proximal hip recruitment and positioning and eccentric body weight control with ambulation re-education including up and down stairs     Home Exercise Program:    [x] (07827) Reviewed/Progressed HEP activities related to strengthening, flexibility, endurance, ROM of core, proximal hip and LE for functional self-care, mobility, lifting and ambulation/stair navigation   [] (83810)Reviewed/Progressed HEP activities related to improving balance, coordination, kinesthetic sense, posture, motor skill, proprioception of core, proximal hip and LE for self care, mobility, lifting, and ambulation/stair navigation    Access Code: [email or web address]  URL: [email or web address]  Date: 10/20/2022  Prepared by: Vira Torre    Exercises  Seated Table Hamstring Stretch - 2 x daily - 7 x weekly - 1 sets - 5 reps - 30 hold  Long Sitting Calf Stretch with Strap - 2 x daily - 7 x weekly - 1 sets - 5 reps - 30 hold  Supine Heel Slide with Strap - 2 x daily - 7 x weekly - 1 sets - 10 reps - 10 hold  Supine Ankle Pumps - 6 x daily - 7 x weekly - 3 sets - 10 reps  Long Sitting Quad Set - 5 x daily - 7 x weekly - 1 sets - 10 reps - 10 hold  Supine Straight Leg Raises - 2 x daily - 7 x weekly - 3 sets - 10 reps  Sidelying Hip Abduction - 1 x daily - 7 x weekly - 3 sets - 10 reps  Supine Hip Adduction Isometric with Ball - 1 x daily - 7 x weekly - 1 sets - 10 reps - 10 hold  Sidelying Hip Adduction - 1 x daily - 7 x weekly - 3 sets - 10 reps  Prone Hip Extension - 1 x daily - 7 x weekly - 3 sets - 10 reps  Standing Heel Raise - 1 x daily - 7 x weekly - 3 sets - 10 reps  Mini Squat - 1 x daily - 7 x weekly - 3 sets - 10 reps  Single Leg Stance - 1 x daily - 7 x weekly - 5 sets - 1 reps - 30 hold    Manual Treatments:  PROM / STM / Oscillations-Mobs:  G-I, II, III, IV (PA's, Inf., Post.)  [x] (62076) Provided manual therapy to mobilize LE, proximal hip and/or LS spine soft tissue/joints for the purpose of modulating pain, promoting relaxation,  increasing ROM, reducing/eliminating soft tissue swelling/inflammation/restriction, improving soft tissue extensibility and allowing for proper ROM for normal function with self care, mobility, lifting and ambulation. Modalities:  [] GAME READY (VASO)- for significant edema, swelling, pain control.      Charges:  Timed Code Treatment Minutes: 53   Total Treatment Minutes: 65     [] EVAL (LOW) 54625   [] EVAL (MOD) 75631   [] EVAL (HIGH) 47362   [] RE-EVAL   [x] PY(28446) x   1  [] IONTO  [x] NMR (13342) x  2  [] VASO  [x] Manual (21657) x  1    [] Other:  [] TA x     [] Mech Traction (15796)  [] ES(attended) (58565)      [] ES (un) (92778):       GOALS: Patient stated goal: full range mobility, get back to tennis as soon as he can    [] Progressing: [] Met: [] Not Met: [] Adjusted     Therapist goals for Patient:   Short Term Goals: To be achieved in: 2 weeks  1. Independent in HEP and progression per patient tolerance, in order to prevent re-injury. [] Progressing: [x] Met: [] Not Met: [] Adjusted   2. Patient will have a decrease in pain to facilitate improvement in movement, function, and ADLs as indicated by Functional Deficits. [] Progressing: [x] Met: [] Not Met: [] Adjusted     Long Term Goals: To be achieved in: 4-6 weeks  1. Functional index score of 43 or more for FOTO to assist with reaching prior level of function. [] Progressing: [] Met: [] Not Met: [] Adjusted  2. Patient will demonstrate increased R knee AROM to 0-130 to allow for proper joint functioning as indicated by patients Functional Deficits. [] Progressing: [] Met: [] Not Met: [] Adjusted  3. Patient will demonstrate an increase in R knee strength to 5/5 flex and extn in LE to allow for proper functional mobility as indicated by patients Functional Deficits. [] Progressing: [] Met: [] Not Met: [] Adjusted  4. Patient will return to community ambulation including going up and down steps without increased symptoms or restriction. [] Progressing: [] Met: [] Not Met: [] Adjusted  5. Pt will return to full work duties including carrying 40lbs, steps, and squatting without pain in knee. [] Progressing: [] Met: [] Not Met: [] Adjusted       6. Pt will return to running, cycling, and playing with kids without pain or limitation in R knee to be able to return to prior level of activity.    [] Progressing: [] Met: [] Not Met: [] Adjusted                      Overall Progression Towards Functional goals/ Treatment Progress Update:  [] Patient is progressing as expected towards functional goals listed. [] Progression is slowed due to complexities/Impairments listed. [] Progression has been slowed due to co-morbidities. [x] Plan just implemented, too soon to assess goals progression <30days   [] Goals require adjustment due to lack of progress  [] Patient is not progressing as expected and requires additional follow up with physician  [] Other    Prognosis for POC: [x] Good [] Fair  [] Poor      Patient requires continued skilled intervention: [x] Yes  [] No      ASSESSMENT:  See eval    Treatment/Activity Tolerance:  [x] Patient tolerated treatment well [] Patient limited by fatique  [] Patient limited by pain  [] Patient limited by other medical complications  [x] Other:  Pt tolerated treatment session well today. PT felt good response to CFM to medial hamstring tendon and pt demonstrated moderate relief with manual therapy. Progressed BFR with leg press instead of mini squats and added LAQ which appropriately challenged pt. Progressed standing hip abduction and added knee flexion eccentric with machine to target hamstring tendon. Pt had less symptoms following the session.      Return to Play: (if applicable)   []  Stage 1: Intro to Strength   []  Stage 2: Return to Run and Strength   []  Stage 3: Return to Jump and Strength   []  Stage 4: Dynamic Strength and Agility   []  Stage 5: Sport Specific Training     []  Ready to Return to Play, Meets All Above Stages   []  Not Ready for Return to Sports   Comments:            Prognosis: [x] Good [] Fair  [] Poor    Patient Requires Follow-up: [x] Yes  [] No    PLAN: See eval; step ups vs heel taps to short step,   [x] Continue per plan of care [] Alter current plan (see comments above)  [] Plan of care initiated [] Hold pending MD visit [] Discharge    Note: If patient does not return for scheduled/ recommended follow up visits, this note will serve as a discharge from care along with most recent update on progress. Electronically signed by: Therapist was present, directed the patient's care, made skilled judgement, and was responsible for assessment and treatment of the patient.  Vini Brunner, SHAWN Rivera, PT, DPT

## 2022-11-04 ENCOUNTER — HOSPITAL ENCOUNTER (OUTPATIENT)
Dept: PHYSICAL THERAPY | Age: 37
Setting detail: THERAPIES SERIES
Discharge: HOME | End: 2022-11-04
Payer: COMMERCIAL

## 2022-11-04 PROCEDURE — 97110 THERAPEUTIC EXERCISES: CPT | Performed by: PHYSICAL THERAPIST

## 2022-11-04 PROCEDURE — 97112 NEUROMUSCULAR REEDUCATION: CPT | Performed by: PHYSICAL THERAPIST

## 2022-11-04 NOTE — PLAN OF CARE
[place] [facility] [address] [facility]  Phone: [phone]   Fax: [phone]     Physical Therapy Re-Certification Plan of Skyla Martinez      Dear  Dr. Adalid Pak,    We had the pleasure of treating the following patient for physical therapy services at [address] [place] [address]. A summary of our findings can be found in the updated assessment below. This includes our plan of care. If you have any questions or concerns regarding these findings, please do not hesitate to contact me at the office phone number checked above. Thank you for the referral.     Physician Signature:________________________________Date:__________________  By signing above (or electronic signature), therapists plan is approved by physician    Total Visits to Date: 6  Overall Response to Treatment:   [x]Patient is responding well to treatment and improvement is noted with regards  to goals   []Patient should continue to improve in reasonable time if they continue HEP   []Patient has plateaued and is no longer responding to skilled PT intervention    []Patient is getting worse and would benefit from return to referring MD   []Patient unable to adhere to initial POC   [x]Other:  pt wants to make today's visit his last visit. He is doing well and pain-free. He has full knee ROM and strength with MMT. He has been back to work without limits. He has done some riding his bike lightly outdoor and did try a jump without pain. He has not yet tried running. Pt was progressed in LE strengthening today and given an updated HEP to continue with and encouraged pt to keep working on his R LE strength. Pt will be d/c to HEP since he wants to make this his last visit but educated pt he may return as needed.      Physical Therapy Daily Treatment Note    Date:  2022     Patient Name:  Georgette Rinaldi    :  1985  MRN: [id number]    Medical Diagnosis:  E36.976H (ICD-10-CM) - Traumatic tear of medial meniscus of right knee, subsequent encounter   Arthroscopy of the right knee with partial medial meniscectomy and resection of plica on 67/02/63. Treatment Diagnosis:    ICD-10-CM    1. Right knee pain, unspecified chronicity  M25.561       2. Weakness of right lower extremity  R29.898         Insurance/Certification information:  PT Insurance Information: [facility]- 20 visits, no auth, $15 copay  Referring Provider:  Lalitha Boggs MD  Has the plan of care been signed (Y/N):        [x]  Yes  []  No     Date of Patient follow up with Physician: 11/28      Is this a Progress Report:     [x]  Yes  []  No        If Yes:  Date Range for reporting period:  Beginning- 10/13/2022   Ending-11/4/22    Progress report will be due (10 Rx or 30 days whichever is less):       Recertification will be due (POC Duration  / 90 days whichever is less): as above        Visit # Insurance Allowable Auth Required   6 20 []  Yes [x]  No        Functional Scale:  FOTO- 81    Date assessed:  11/4/2022      Latex Allergy:  [x]NO      []YES  Preferred Language for Healthcare:   [x]English       []other:      Pain level:  0/10  on 11/4/2022    SUBJECTIVE: Pt reports feeling good since last visit. No pain or discomfort and has started to ride his bike outside again. No pain with this. He still has slight swelling near his incision points and feels some tension when he bends his knee all the way. States this will be his last visit    OBJECTIVE:   Observation:   Test measurements: 11/4/22  Joint mobility:               []Normal                       []Hypo-               []Hyper     Palpation:      Functional Mobility/Transfers:      Posture: WNL     Bandages/Dressings/Incisions:  .    Gait: (include devices/WB status)  full Wbing and non-antalgic      Single leg stance: 30 secs     Squats: good form and no pain     Quad recruitment: improved quad recruitment Flexibility L R Comment   Hamstring   Fair- 90/90 position   Gastroc   Fair -      ITB         Quad     Heel to buttock distance                             ROM PROM AROM Overpressure Comment     L R- 10/20 L R-11/4/22 L R     Flexion  153 138  sheet pulls    146         Extension                                                           Strength- 11/4/22 L R Comment   Quad  5  5     Hamstring  5  5     Gastroc         Hip flexor  5 5      Hip ABD    4+     Hip extension                         RESTRICTIONS/PRECAUTIONS: none    Exercises/Interventions:   Exercise/Equipment Resistance/Repetitions Other comments   Stretching     Hamstring 3 x 30 secs    Hip Flexion     ITB     Groin     Quad 3 x 30 secs standing    Inclined Calf 3 x 30 secs                 SLR     Supine    Prone    Abduction    Adducton    SLR+ 5 x 10 secs reclined    Clams     bridges 2 x 10, 2 sec pause with grey band           Isometrics     Quad sets    Hip Adduction    Hamstring                    Patellar Glides     Medial     Superior     Inferior          ROM     Sheet Pulls    Wall Slides     Edge of bed     Flexionator     Extensionator     Hang Weights                                 CKC     Standing hip abduction  Plinth support         Calf raises 3x10 SL Wall sits 3 x 30 secs    Step ups     Step up and over     Lateral Step Downs 3 x 10 4 in              Mini squats 3x10 15 lbs goblet hold    CC TKE 15# @ CC   TKE w/ march Using ball @ wall        Lateral band walks 3 laps grey band    Side Planks     Half moon     Single leg flow          Biodex-balance     Single leg stance Foam pad; high knee position    Plyoback 3 x 15 SLS on airex 4#         Stool scoots     SB bridge     SB HS Curls     Planks     Gait training    PRE     Extension DL 3 x 10 44 lbs RANGE: 90/40   Flexion RANGE: 0/90        Cable Column          Leg Press 3 x 10  SL 80#         Bike 5 min- upright    Treadmill             Manual Therapy     CFM to medial hamstring (semitendinosus)  tendon, STM to proximal calf with TrP release                     BF    Patient education: Pt was educated on PT diagnosis, prognosis, and plan of care. Pt was educated on the use of ice throughout the day. Pt was educated on signs/symptoms of infection and DVT and to call with any questions or concerns. Pt educated on post-op dressings, FILIBERTO hose, and use of crutches/AD. Therapeutic Exercise and NMR EXR  [x] (79572) Provided verbal/tactile cueing for activities related to strengthening, flexibility, endurance, ROM for improvements in LE, proximal hip, and core control with self care, mobility, lifting, ambulation.  [] (03660) Provided verbal/tactile cueing for activities related to improving balance, coordination, kinesthetic sense, posture, motor skill, proprioception  to assist with LE, proximal hip, and core control in self care, mobility, lifting, ambulation and eccentric single leg control.      NMR and Therapeutic Activities:    [x] (99894 or 07946) Provided verbal/tactile cueing for activities related to improving balance, coordination, kinesthetic sense, posture, motor skill, proprioception and motor activation to allow for proper function of core, proximal hip and LE with self care and ADLs  [] (00345) Gait Re-education- Provided training and instruction to the patient for proper LE, core and proximal hip recruitment and positioning and eccentric body weight control with ambulation re-education including up and down stairs     Home Exercise Program:    [x] (94797) Reviewed/Progressed HEP activities related to strengthening, flexibility, endurance, ROM of core, proximal hip and LE for functional self-care, mobility, lifting and ambulation/stair navigation   [] (68417)Reviewed/Progressed HEP activities related to improving balance, coordination, kinesthetic sense, posture, motor skill, proprioception of core, proximal hip and LE for self care, mobility, lifting, and ambulation/stair navigation    Access Code: [email or web address]  URL: [email or web address]  Date: 11/04/2022  Prepared by: Jasmin Mendez    Exercises  Seated Table Hamstring Stretch - 2 x daily - 7 x weekly - 1 sets - 5 reps - 30 hold  Standing Quadriceps Stretch - 2 x daily - 7 x weekly - 1 sets - 5 reps - 30 hold  Standing Bilateral Gastroc Stretch with Step - 2 x daily - 7 x weekly - 1 sets - 5 reps - 30 hold  Straight Leg Raise with Arm Support - 1 x daily - 7 x weekly - 3 sets - 1 reps - 30 hold  Mini Squat - 1 x daily - 7 x weekly - 3 sets - 10 reps  Side Stepping with Resistance at Thighs - 1 x daily - 7 x weekly - 3 sets - 10 reps  Wall Sit - 1 x daily - 7 x weekly - 3 sets - 1 reps - 30 hold  Standing Single Leg Heel Raise - 1 x daily - 7 x weekly - 3 sets - 10 reps  Lateral Step Down - 1 x daily - 7 x weekly - 3 sets - 10 reps  Supine Bridge with Resistance Band - 1 x daily - 7 x weekly - 3 sets - 10 reps - 2 hold      Manual Treatments:  PROM / STM / Oscillations-Mobs:  G-I, II, III, IV (PA's, Inf., Post.)  [x] (77433) Provided manual therapy to mobilize LE, proximal hip and/or LS spine soft tissue/joints for the purpose of modulating pain, promoting relaxation,  increasing ROM, reducing/eliminating soft tissue swelling/inflammation/restriction, improving soft tissue extensibility and allowing for proper ROM for normal function with self care, mobility, lifting and ambulation. Modalities:  [] GAME READY (VASO)- for significant edema, swelling, pain control.      Charges:  Timed Code Treatment Minutes: 42   Total Treatment Minutes: 46     [] EVAL (LOW) 20937   [] EVAL (MOD) 04987   [] EVAL (HIGH) 84415   [] RE-EVAL   [x] SC(45336) x   2  [] IONTO  [x] NMR (41827) x  1  [] VASO  [] Manual (29461) x      [] Other:  [] TA x     [] Mech Traction (70419)  [] ES(attended) (88223)      [] ES (un) (05331):       GOALS: Patient stated goal: full range mobility, get back to tennis as soon as he can    [] Progressing: [x] Met: [] Not Met: [] Adjusted     Therapist goals for Patient:   Short Term Goals: To be achieved in: 2 weeks  1. Independent in HEP and progression per patient tolerance, in order to prevent re-injury. [] Progressing: [x] Met: [] Not Met: [] Adjusted   2. Patient will have a decrease in pain to facilitate improvement in movement, function, and ADLs as indicated by Functional Deficits. [] Progressing: [x] Met: [] Not Met: [] Adjusted     Long Term Goals: To be achieved in: 4-6 weeks  1. Functional index score of 43 or more for FOTO to assist with reaching prior level of function. [] Progressing: [x] Met: [] Not Met: [] Adjusted  2. Patient will demonstrate increased R knee AROM to 0-130 to allow for proper joint functioning as indicated by patients Functional Deficits. [] Progressing: [x] Met: [] Not Met: [] Adjusted  3. Patient will demonstrate an increase in R knee strength to 5/5 flex and extn in LE to allow for proper functional mobility as indicated by patients Functional Deficits. [] Progressing: [x] Met: [] Not Met: [] Adjusted  4. Patient will return to community ambulation including going up and down steps without increased symptoms or restriction. [] Progressing: [x] Met: [] Not Met: [] Adjusted  5. Pt will return to full work duties including carrying 40lbs, steps, and squatting without pain in knee. [] Progressing: [x] Met: [] Not Met: [] Adjusted       6. Pt will return to running, cycling, and playing with kids without pain or limitation in R knee to be able to return to prior level of activity. [x] Progressing: has been riding bike and playing with kids but not running yet [] Met: [x] Not Met: [] Adjusted                      Overall Progression Towards Functional goals/ Treatment Progress Update:  [x] Patient is progressing as expected towards functional goals listed. [] Progression is slowed due to complexities/Impairments listed.   [] Progression has been slowed due to co-morbidities. [] Plan just implemented, too soon to assess goals progression <30days   [] Goals require adjustment due to lack of progress  [] Patient is not progressing as expected and requires additional follow up with physician  [] Other    Prognosis for POC: [x] Good [] Fair  [] Poor      Patient requires continued skilled intervention: [x] Yes  [] No      ASSESSMENT:      Treatment/Activity Tolerance:  [x] Patient tolerated treatment well [] Patient limited by fatique  [] Patient limited by pain  [] Patient limited by other medical complications  [x] Other:  pt wants to make today's visit his last visit. He is doing well and pain-free. He has full knee ROM and strength with MMT. He has been back to work without limits. He has done some riding his bike lightly outdoor and did try a jump without pain. He has not yet tried running. Pt was progressed in LE strengthening today and given an updated HEP to continue with and encouraged pt to keep working on his R LE strength. Pt will be d/c to HEP since he wants to make this his last visit but educated pt he may return as needed. Return to Play: (if applicable)   []  Stage 1: Intro to Strength   []  Stage 2: Return to Run and Strength   []  Stage 3: Return to Jump and Strength   []  Stage 4: Dynamic Strength and Agility   []  Stage 5: Sport Specific Training     []  Ready to Return to Play, Meets All Above Stages   []  Not Ready for Return to Sports   Comments:            Prognosis: [x] Good [] Fair  [] Poor    Patient Requires Follow-up: [x] Yes  [] No    PLAN:   [] Continue per plan of care [] Alter current plan (see comments above)  [] Plan of care initiated [] Hold pending MD visit [x] Discharge but may follow up as neeeded    Note: If patient does not return for scheduled/ recommended follow up visits, this note will serve as a discharge from care along with most recent update on progress.      Electronically signed by:     Vito Marin PT, DPT

## 2022-11-07 ENCOUNTER — TELEPHONE (OUTPATIENT)
Dept: ORTHOPEDIC SURGERY | Age: 37
End: 2022-11-07

## 2022-11-28 ENCOUNTER — TELEPHONE (OUTPATIENT)
Dept: ORTHOPEDIC SURGERY | Age: 37
End: 2022-11-28

## 2022-11-28 ENCOUNTER — OFFICE VISIT (OUTPATIENT)
Dept: ORTHOPEDIC SURGERY | Age: 37
End: 2022-11-28

## 2022-11-28 VITALS — BODY MASS INDEX: 25.92 KG/M2 | HEIGHT: 69 IN | WEIGHT: 175 LBS | RESPIRATION RATE: 12 BRPM

## 2022-11-28 DIAGNOSIS — S83.241D TRAUMATIC TEAR OF MEDIAL MENISCUS OF RIGHT KNEE, SUBSEQUENT ENCOUNTER: Primary | ICD-10-CM

## 2022-11-28 PROCEDURE — 99024 POSTOP FOLLOW-UP VISIT: CPT | Performed by: ORTHOPAEDIC SURGERY

## 2022-11-28 PROCEDURE — 1073RET COMPLETION OF WORKABILITY PRESCRIPTION: Performed by: ORTHOPAEDIC SURGERY

## 2022-11-28 NOTE — PROGRESS NOTES
Christina Kent returns today to follow-up his right knee arthroscopy performed about 6 weeks ago. He is doing well and reports no pain. He is back in the gym. He is back at work full duty without restriction. Today, incisions are nicely healed. He has no effusion. He has some very mild tenderness anteriorly. Calf is soft. At this point he is going to progress toward full activity with his home exercise program.  He will follow-up with me on an as-needed basis. He has no restrictions at work.

## 2023-06-02 ENCOUNTER — HOSPITAL ENCOUNTER (OUTPATIENT)
Dept: INTERVENTIONAL RADIOLOGY/VASCULAR | Age: 38
Discharge: HOME | End: 2023-06-02
Attending: NEUROLOGICAL SURGERY | Admitting: NEUROLOGICAL SURGERY
Payer: COMMERCIAL

## 2023-06-02 VITALS
HEIGHT: 68 IN | DIASTOLIC BLOOD PRESSURE: 64 MMHG | OXYGEN SATURATION: 97 % | RESPIRATION RATE: 16 BRPM | TEMPERATURE: 97.5 F | HEART RATE: 64 BPM | SYSTOLIC BLOOD PRESSURE: 109 MMHG | WEIGHT: 174 LBS | BODY MASS INDEX: 26.37 KG/M2

## 2023-06-02 LAB
ALBUMIN SERPL-MCNC: 5 G/DL (ref 3.4–5)
ALBUMIN/GLOB SERPL: 2 {RATIO} (ref 1.1–2.2)
ALP SERPL-CCNC: 87 U/L (ref 40–129)
ALT SERPL-CCNC: 13 U/L (ref 10–40)
ANION GAP SERPL CALCULATED.3IONS-SCNC: 12 MMOL/L (ref 3–16)
AST SERPL-CCNC: 19 U/L (ref 15–37)
BILIRUB SERPL-MCNC: 1.3 MG/DL (ref 0–1)
BUN SERPL-MCNC: 12 MG/DL (ref 7–20)
CALCIUM SERPL-MCNC: 9.5 MG/DL (ref 8.3–10.6)
CHLORIDE SERPL-SCNC: 100 MMOL/L (ref 99–110)
CO2 SERPL-SCNC: 27 MMOL/L (ref 21–32)
CREAT SERPL-MCNC: 0.8 MG/DL (ref 0.9–1.3)
DEPRECATED RDW RBC AUTO: 13.6 % (ref 12.4–15.4)
GFR SERPLBLD CREATININE-BSD FMLA CKD-EPI: >60 ML/MIN/{1.73_M2}
GLUCOSE SERPL-MCNC: 86 MG/DL (ref 70–99)
HCT VFR BLD AUTO: 47.4 % (ref 40.5–52.5)
HGB BLD-MCNC: 16 G/DL (ref 13.5–17.5)
INR PPP: 1.02 (ref 0.84–1.16)
MCH RBC QN AUTO: 30.3 PG (ref 26–34)
MCHC RBC AUTO-ENTMCNC: 33.8 G/DL (ref 31–36)
MCV RBC AUTO: 89.6 FL (ref 80–100)
PLATELET # BLD AUTO: 222 K/UL (ref 135–450)
PMV BLD AUTO: 7.8 FL (ref 5–10.5)
POTASSIUM SERPL-SCNC: 3.8 MMOL/L (ref 3.5–5.1)
PROT SERPL-MCNC: 7.5 G/DL (ref 6.4–8.2)
PROTHROMBIN TIME: 13.4 SEC (ref 11.5–14.8)
RBC # BLD AUTO: 5.29 M/UL (ref 4.2–5.9)
SODIUM SERPL-SCNC: 139 MMOL/L (ref 136–145)
WBC # BLD AUTO: 4.9 K/UL (ref 4–11)

## 2023-06-02 PROCEDURE — 85027 COMPLETE CBC AUTOMATED: CPT

## 2023-06-02 PROCEDURE — 80053 COMPREHEN METABOLIC PANEL: CPT

## 2023-06-02 PROCEDURE — 36226 PLACE CATH VERTEBRAL ART: CPT

## 2023-06-02 PROCEDURE — 6360000004 HC RX CONTRAST MEDICATION: Performed by: NEUROLOGICAL SURGERY

## 2023-06-02 PROCEDURE — C1887 CATHETER, GUIDING: HCPCS

## 2023-06-02 PROCEDURE — 36227 PLACE CATH XTRNL CAROTID: CPT

## 2023-06-02 PROCEDURE — 85610 PROTHROMBIN TIME: CPT

## 2023-06-02 PROCEDURE — C1769 GUIDE WIRE: HCPCS

## 2023-06-02 PROCEDURE — C1760 CLOSURE DEV, VASC: HCPCS

## 2023-06-02 PROCEDURE — C1894 INTRO/SHEATH, NON-LASER: HCPCS

## 2023-06-02 PROCEDURE — 99153 MOD SED SAME PHYS/QHP EA: CPT

## 2023-06-02 PROCEDURE — 36224 PLACE CATH CAROTD ART: CPT

## 2023-06-02 PROCEDURE — 99152 MOD SED SAME PHYS/QHP 5/>YRS: CPT

## 2023-06-02 RX ORDER — SODIUM CHLORIDE 9 MG/ML
INJECTION, SOLUTION INTRAVENOUS CONTINUOUS
Status: DISCONTINUED | OUTPATIENT
Start: 2023-06-02 | End: 2023-06-02 | Stop reason: HOSPADM

## 2023-06-02 RX ORDER — IODIXANOL 270 MG/ML
200 INJECTION, SOLUTION INTRAVASCULAR ONCE
Status: COMPLETED | OUTPATIENT
Start: 2023-06-02 | End: 2023-06-02

## 2023-06-02 RX ORDER — ONDANSETRON 2 MG/ML
4 INJECTION INTRAMUSCULAR; INTRAVENOUS EVERY 8 HOURS PRN
Status: DISCONTINUED | OUTPATIENT
Start: 2023-06-02 | End: 2023-06-02 | Stop reason: HOSPADM

## 2023-06-02 RX ORDER — HYDROCODONE BITARTRATE AND ACETAMINOPHEN 5; 325 MG/1; MG/1
2 TABLET ORAL EVERY 4 HOURS PRN
Status: DISCONTINUED | OUTPATIENT
Start: 2023-06-02 | End: 2023-06-02 | Stop reason: HOSPADM

## 2023-06-02 RX ORDER — ACETAMINOPHEN 325 MG/1
650 TABLET ORAL EVERY 4 HOURS PRN
Status: DISCONTINUED | OUTPATIENT
Start: 2023-06-02 | End: 2023-06-02 | Stop reason: HOSPADM

## 2023-06-02 RX ORDER — HYDROCODONE BITARTRATE AND ACETAMINOPHEN 5; 325 MG/1; MG/1
1 TABLET ORAL EVERY 4 HOURS PRN
Status: DISCONTINUED | OUTPATIENT
Start: 2023-06-02 | End: 2023-06-02 | Stop reason: HOSPADM

## 2023-06-02 RX ADMIN — IODIXANOL 50 ML: 270 INJECTION, SOLUTION INTRAVASCULAR at 10:23

## 2023-06-02 NOTE — DISCHARGE INSTRUCTIONS
[facility]  Cardiovascular Special Procedures  Cerebral Angiogram  Patient Discharge Instructions           Day 1 (Procedure Day):  Rest for the remainder of the day. Minimal stair climbing, if you must use stairs, lead with your unaffected leg. Do not drive a car. Do not be alone. Avoid prolonged sitting, walk around occasionally until bedtime tonight. Leave dressing intact. Day 2:  You may climb stairs, begin slowly  You may drive a car, unless otherwise directed by physician. Remove dressing. You may bathe/shower, but wash gently around the puncture site and pat dry. Place new band-aid on site daily until skin heals. Things to Avoid:  You may not do any strenuous exercises for one week. (ex: golfing, bowling, tennis, vacuum, snow removal, jogging, and aerobic exercises). No hot tubs, baths, or pools for one week. Do not lift anything over 10 pounds for 10 days. Avoid positions that would put pressure on your groin. Like sitting upright in a straight back chair. No lotions, powders, or ointments near site for 5 days. Things to watch for:  You may have a small lump or a bruise. This is common and will go away. If the lump increases and site becomes painful, call physician immediately. If mild discomfort occurs at the puncture site you may place an ice pack on the site at 15 minute intervals. If the puncture site starts to bleed, immediately lie on a hard flat surface and apply pressure just above the puncture site. Have someone call 911 and maintain pressure until the EMS arrives. If you have loss of color, extreme coldness or numbness of the leg, call 911 immediately. Excessive pain of the groin, thigh or calf, call your physician immediately. Watch for signs and symptoms of an infection at the groin site (fever, local pain, redness, warmth or discharge from the puncture site), call your physician immediately if any develop.       Any Questions please call us at [phone]

## 2023-06-02 NOTE — H&P
History of Present Illness   Chief Complaint: Follow up for known arteriovenous malformation; discuss angio    HPI: Mr. Norma Beck comes in with his wife to discuss an MRI finding suggestive of AVM. This was first found 10 years ago on workup of headaches. He currently tells me that he has a new pattern of headache over the past couple of years with a throbbing, moderately severe headache preceded by a bright line across his field of vision, then tunnel vision. He cannot identify any triggers. He feels better if he avoids bright lights, lies down and takes ibuprofen. He does drink coffee in the mornings but stops by 2pm and does not drink soda. When his vascular malformation was found in 2013, he met me, at my  office, and I had recommended angiography to differentiate AVM from venous angioma. He did not follow thru with that, but is more concerned about it now that he has children. Vital Signs for Today's Encounter     Height: 5' 9'' Weight: 174 pounds    BMI: 25.69 (Patient was advised of the benefits of maintaining a healthy weight and counseled on methods to achieve that weight.)    Medical History       Prior neck/back surgery? yes    Area(s): 2003 cyst removed from wrist; left knee surgery  Has pt. had any other surgery? no  Comorbidities:none  Supplemental oxygen? no  Bleeding/clotting disorder? no  Blood thinner?none  Has the patient had angioplasty? no  Does patient have stents? no  Other implant: none  Has patient had a flu shot this flu season (8/1-3/31)? Yes    Allergies   Allergic to shellfish, contrast dye, or iodine? NKA  Allergic to latex? NKA  Allergic to metals/jewelry? NKA  Allergic to steroids? NKA  Allergic to antibiotics? NKA  Allergic to tape? NKA  Other allergies? no    Social History   Does the patient live with someone else? yes  Is this person able to help at home if surgery is needed?  yes  Does patient have a relative/close friend capable of helping at home? yes  Smoking status: never

## 2023-06-02 NOTE — PROCEDURES
DATE OF THE PROCEDURE: 6/2/2023  HISTORY OF PRESENT ILLNESS: 43year old man was diagnosed 10 years ago with brain AVM during workup of migraines. My review of his imaging suggests a benign venous angioma without AVM, so he presents for diagnostic angiography. OPERATORS: Marie Steele M.D., attending. SUPERVISION AND INTERPRETATION: Dr. Cl Beckham personally performed the technical portions of the procedure. Following completion of the technical portions of the procedure, the angiographic images were reviewed at the neurography PACS work station by Dr. Carmen Lund. PROCEDURE:  1. Diagnostic Cerebral Angiogram.     VESSELS CATHETERIZED:   1. Right internal carotid artery. 2. Left internal carotid artery. 3. Left vertebral artery. 4. Right vertebral artery. 5. Right external carotid artery. 6. Left external carotid artery. ANESTHESIA: Prior to the procedure, the patient's personal and family history were reviewed for any adverse reactions to anesthesia and no pertinent concerns were raised. ASA rdgrdrrdarddrderd:rd rd3rd Malampati: II  Conscious sedation and intravenous anesthesia was given by the radiology nurse under direct supervision of the attending physician for 30 minutes. Monitored nursing care and medication reports are available on the chart. Local anesthesia was provided at the puncture site with 1% lidocaine. MEDICATIONS GIVEN: Fentanyl IV and Versed IV, 1% lidocaine, subcutaneous. RADIOCONTRAST: 70 mL Optiray 320 contrast IA. EBL: 10 mL    DEVICES USED:   1. Micropuncture kit. 2. 5-Qatari sheath. 3. 0.035\" Glidewire. 4. 5-Qatari angled glide catheter. 5. 6-Qatari AngioSeal closure device. PROCEDURE TIME: 30 minutes. FLUOROSCOPY TIME: 3.3 minutes. CONSENT: Prior to the procedure, the technical aspect of the procedure as well as the potential risks and benefits were explained to the patient.  Specifically, the risk of cerebral infarction, puncture site hemorrhage,

## 2025-08-28 ENCOUNTER — HOSPITAL ENCOUNTER (EMERGENCY)
Age: 40
Discharge: HOME | End: 2025-08-28
Attending: EMERGENCY MEDICINE
Payer: COMMERCIAL

## 2025-08-28 VITALS
TEMPERATURE: 98.3 F | BODY MASS INDEX: 28.05 KG/M2 | RESPIRATION RATE: 18 BRPM | DIASTOLIC BLOOD PRESSURE: 75 MMHG | OXYGEN SATURATION: 100 % | SYSTOLIC BLOOD PRESSURE: 148 MMHG | HEART RATE: 75 BPM | WEIGHT: 189.38 LBS | HEIGHT: 69 IN

## 2025-08-28 DIAGNOSIS — M79.605 LEFT LEG PAIN: Primary | ICD-10-CM

## 2025-08-28 PROCEDURE — 99283 EMERGENCY DEPT VISIT LOW MDM: CPT

## 2025-08-28 ASSESSMENT — PAIN - FUNCTIONAL ASSESSMENT: PAIN_FUNCTIONAL_ASSESSMENT: 0-10

## 2025-08-28 ASSESSMENT — PAIN SCALES - GENERAL: PAINLEVEL_OUTOF10: 3

## (undated) DEVICE — ALCOHOL  RUBBING 70PERC ISOPROPYL  16-OZ

## (undated) DEVICE — SPONGE GZ W4XL4IN COT 12 PLY TYP VII WVN C FLD DSGN

## (undated) DEVICE — APPLICATOR MEDICATED 26 CC SOLUTION HI LT ORNG CHLORAPREP

## (undated) DEVICE — 3M™ STERI-DRAPE™ U-DRAPE 1015: Brand: STERI-DRAPE™

## (undated) DEVICE — STERILE POLYISOPRENE POWDER-FREE SURGICAL GLOVES: Brand: PROTEXIS

## (undated) DEVICE — SOLUTION IRRIG 3000ML LAC RINGERS ARTHROMTC PLAS CONT

## (undated) DEVICE — STERILE LATEX POWDER-FREE SURGICAL GLOVESWITH NITRILE COATING: Brand: PROTEXIS

## (undated) DEVICE — STRIP,CLOSURE,WOUND,MEDI-STRIP,1/2X4: Brand: MEDLINE

## (undated) DEVICE — PROBE ABLAT 50DEG ASPIR MULTIPORT BPLR RF 1 PC ELECTRD ERGO

## (undated) DEVICE — SUTURE NONABSORBABLE MONOFILAMENT 4-0 FS-2 18 IN ETHILON 662H

## (undated) DEVICE — KNEE ARTHROSCOPY: Brand: MEDLINE INDUSTRIES, INC.

## (undated) DEVICE — TUBING PMP L16FT MAIN DISP FOR AR-6400 AR-6475

## (undated) DEVICE — BLADE SHV L13CM DIA4MM TAPR TIP SCIS LIKE CUT OVL OUTER

## (undated) DEVICE — GOWN,REINF,POLY,AURORA,XLNG/XL,STRL: Brand: MEDLINE

## (undated) DEVICE — COVER LT HNDL BLU PLAS